# Patient Record
Sex: MALE | Race: BLACK OR AFRICAN AMERICAN | URBAN - METROPOLITAN AREA
[De-identification: names, ages, dates, MRNs, and addresses within clinical notes are randomized per-mention and may not be internally consistent; named-entity substitution may affect disease eponyms.]

---

## 2021-09-23 ENCOUNTER — OFFICE VISIT (OUTPATIENT)
Dept: FAMILY MEDICINE CLINIC | Facility: CLINIC | Age: 71
End: 2021-09-23
Payer: COMMERCIAL

## 2021-09-23 VITALS
TEMPERATURE: 98 F | RESPIRATION RATE: 17 BRPM | DIASTOLIC BLOOD PRESSURE: 76 MMHG | HEART RATE: 69 BPM | HEIGHT: 70 IN | SYSTOLIC BLOOD PRESSURE: 110 MMHG | WEIGHT: 180 LBS | BODY MASS INDEX: 25.77 KG/M2

## 2021-09-23 DIAGNOSIS — Z76.89 ESTABLISHING CARE WITH NEW DOCTOR, ENCOUNTER FOR: ICD-10-CM

## 2021-09-23 DIAGNOSIS — E78.5 HYPERLIPIDEMIA, UNSPECIFIED HYPERLIPIDEMIA TYPE: ICD-10-CM

## 2021-09-23 DIAGNOSIS — E11.9 TYPE 2 DIABETES MELLITUS WITHOUT COMPLICATION, WITHOUT LONG-TERM CURRENT USE OF INSULIN (HCC): Primary | ICD-10-CM

## 2021-09-23 DIAGNOSIS — I10 ESSENTIAL HYPERTENSION: ICD-10-CM

## 2021-09-23 PROBLEM — G89.11 ACUTE PAIN DUE TO INJURY: Status: ACTIVE | Noted: 2020-11-05

## 2021-09-23 PROBLEM — R58 RETROPERITONEAL BLEED: Status: ACTIVE | Noted: 2021-09-23

## 2021-09-23 PROBLEM — S22.32XA CLOSED FRACTURE OF ONE RIB OF LEFT SIDE: Status: ACTIVE | Noted: 2020-11-05

## 2021-09-23 PROBLEM — S06.0X1A CONCUSSION WITH LOSS OF CONSCIOUSNESS OF 30 MINUTES OR LESS: Status: ACTIVE | Noted: 2020-11-05

## 2021-09-23 PROBLEM — E66.3 OVERWEIGHT: Status: ACTIVE | Noted: 2019-04-04

## 2021-09-23 PROBLEM — S60.511A ABRASION OF RIGHT HAND: Status: ACTIVE | Noted: 2021-09-23

## 2021-09-23 PROBLEM — S00.03XA SCALP HEMATOMA: Status: ACTIVE | Noted: 2020-11-05
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
## 2021-09-23 PROBLEM — S36.892A TRAUMATIC RETROPERITONEAL HEMATOMA: Status: ACTIVE | Noted: 2020-11-05

## 2021-09-23 LAB
CARTRIDGE EXPIRATION DATE: 0 DATE
CARTRIDGE LOT#: 844 NUMERIC
HEMOGLOBIN A1C: 6.6 % (ref 4.3–5.6)

## 2021-09-23 PROCEDURE — 36415 COLL VENOUS BLD VENIPUNCTURE: CPT | Performed by: FAMILY MEDICINE

## 2021-09-23 PROCEDURE — 99204 OFFICE O/P NEW MOD 45 MIN: CPT | Performed by: FAMILY MEDICINE

## 2021-09-23 PROCEDURE — 83036 HEMOGLOBIN GLYCOSYLATED A1C: CPT | Performed by: FAMILY MEDICINE

## 2021-09-23 PROCEDURE — 3074F SYST BP LT 130 MM HG: CPT | Performed by: FAMILY MEDICINE

## 2021-09-23 PROCEDURE — 3008F BODY MASS INDEX DOCD: CPT | Performed by: FAMILY MEDICINE

## 2021-09-23 PROCEDURE — 3078F DIAST BP <80 MM HG: CPT | Performed by: FAMILY MEDICINE

## 2021-09-23 RX ORDER — GLIMEPIRIDE 1 MG/1
1 TABLET ORAL DAILY
Qty: 90 TABLET | Refills: 1 | Status: SHIPPED | OUTPATIENT
Start: 2021-09-23

## 2021-09-23 RX ORDER — METFORMIN HYDROCHLORIDE 500 MG/1
500 TABLET, EXTENDED RELEASE ORAL 2 TIMES DAILY WITH MEALS
Qty: 180 TABLET | Refills: 1 | Status: SHIPPED | OUTPATIENT
Start: 2021-09-23

## 2021-09-23 RX ORDER — ROSUVASTATIN CALCIUM 5 MG/1
5 TABLET, COATED ORAL NIGHTLY
COMMUNITY
End: 2021-09-23

## 2021-09-23 RX ORDER — LISINOPRIL 10 MG/1
10 TABLET ORAL AS DIRECTED
COMMUNITY
End: 2021-09-23

## 2021-09-23 RX ORDER — LISINOPRIL 10 MG/1
10 TABLET ORAL AS DIRECTED
Qty: 90 TABLET | Refills: 1 | Status: SHIPPED | OUTPATIENT
Start: 2021-09-23 | End: 2021-09-26

## 2021-09-23 RX ORDER — GLIMEPIRIDE 1 MG/1
1 TABLET ORAL DAILY
COMMUNITY
End: 2021-09-23

## 2021-09-23 RX ORDER — METFORMIN HYDROCHLORIDE 500 MG/1
TABLET, EXTENDED RELEASE ORAL
COMMUNITY
End: 2021-09-23

## 2021-09-23 RX ORDER — ROSUVASTATIN CALCIUM 5 MG/1
5 TABLET, COATED ORAL NIGHTLY
Qty: 90 TABLET | Refills: 1 | Status: SHIPPED | OUTPATIENT
Start: 2021-09-23

## 2021-09-23 NOTE — PATIENT INSTRUCTIONS
All adult screening ordered and done appropriate for patient's age and gender and risk factors and complaints. Medication reviewed and renewed where needed and appropriate. Encouraged physical fitness and daily physical activity daily.   Comply with medic

## 2021-09-23 NOTE — PROGRESS NOTES
Subjective:   Patient ID: Tessa Caicedo is a 80year old male.     80year old AA male here to establish care with a new physician and here for complete preventive care physical and for status update on any confirmed chronic medical illnesses and follow up He is alert and oriented to person, place, and time. Assessment & Plan:   1. Type 2 diabetes mellitus without complication, without long-term current use of insulin ([facility])  Status update.  - HEMOGLOBIN A1C    2.  Hyperlipidemia, unspecified hyperlipi

## 2021-09-24 ENCOUNTER — TELEPHONE (OUTPATIENT)
Dept: FAMILY MEDICINE CLINIC | Facility: CLINIC | Age: 71
End: 2021-09-24

## 2021-09-24 DIAGNOSIS — I10 ESSENTIAL HYPERTENSION: ICD-10-CM

## 2021-09-24 NOTE — TELEPHONE ENCOUNTER
Pt calling stating that the pharmacy needs more info about pts scripts that were sent. States they need more details like directions and dosage information. Please advise .

## 2021-09-24 NOTE — TELEPHONE ENCOUNTER
[facility], spoke with Weesh. 1)   Lisinopril- is it daily? 2)   Glimepiride- are you in agreement with this dosage, considering  the patients age?  ( [facility] one, he says the dosa

## 2021-09-26 RX ORDER — LISINOPRIL 10 MG/1
10 TABLET ORAL DAILY
Qty: 90 TABLET | Refills: 1 | Status: SHIPPED | OUTPATIENT
Start: 2021-09-26

## 2021-09-26 NOTE — TELEPHONE ENCOUNTER
The lisinopril prescription has been clarified and sent to the pharmacy. The glimepiride dosage is fine for this patient. Thank you.

## 2021-09-27 NOTE — TELEPHONE ENCOUNTER
Informed [facility] of medication per Dr. Houston Johnston. States understanding.

## 2022-03-12 RX ORDER — METFORMIN HYDROCHLORIDE 500 MG/1
TABLET, EXTENDED RELEASE ORAL
Qty: 180 TABLET | Refills: 1 | Status: SHIPPED | OUTPATIENT
Start: 2022-03-12

## 2022-03-12 RX ORDER — ROSUVASTATIN CALCIUM 5 MG/1
TABLET, COATED ORAL
Qty: 90 TABLET | Refills: 1 | Status: SHIPPED | OUTPATIENT
Start: 2022-03-12

## 2022-03-12 NOTE — TELEPHONE ENCOUNTER
Please review. Protocol failed / No Protocol.     Requested Prescriptions   Pending Prescriptions Disp Refills    METFORMIN  MG Oral Tablet 24 Hr [Pharmacy Med Name: METFORMIN ER 500MG 24HR TABS] 180 tablet 1     Sig: TAKE 1 TABLET(500 MG) BY MOUTH TWICE DAILY WITH MEALS        Diabetes Medication Protocol Failed - 3/11/2022 10:11 AM        Failed - GFR  > 50     No results found for: GFRAA              Failed - GFR in the past 12 months        Passed - Last A1C < 7.5 and within past 6 months     Lab Results   Component Value Date    A1C 6.6 (A) 09/23/2021               Passed - Appointment in past 6 or next 3 months           ROSUVASTATIN 5 MG Oral Tab [Pharmacy Med Name: ROSUVASTATIN 5MG TABLETS] 90 tablet 1     Sig: TAKE 1 TABLET(5 MG) BY MOUTH EVERY NIGHT        Cholesterol Medication Protocol Failed - 3/11/2022 10:11 AM        Failed - ALT in past 12 months        Failed - LDL in past 12 months        Failed - Last ALT < 80     No results found for: ALT          Failed - Last LDL < 130     No results found for: LDL            Passed - Appointment in past 12 or next 3 months               Recent Outpatient Visits              5 months ago Type 2 diabetes mellitus without complication, without long-term current use of insulin [facility])    [facility], [facility], [address], [place], [place], [place]    Office Visit

## 2022-03-30 RX ORDER — LISINOPRIL 10 MG/1
TABLET ORAL
Qty: 90 TABLET | Refills: 1 | Status: SHIPPED | OUTPATIENT
Start: 2022-03-30

## 2022-03-30 NOTE — TELEPHONE ENCOUNTER
Please review. Protocol failed / No protocol.   Requested Prescriptions   Pending Prescriptions Disp Refills    LISINOPRIL 10 MG Oral Tab [Pharmacy Med Name: LISINOPRIL 10MG TABLETS] 90 tablet 1     Sig: TAKE 1 TABLET(10 MG) BY MOUTH DAILY        Hypertensive Medications Protocol Failed - 3/30/2022  1:18 PM        Failed - CMP or BMP in past 12 months        Failed - Appointment in past 6 or next 3 months        Failed - GFR  > 50     No results found for: [address]                   Recent Outpatient Visits              6 months ago Type 2 diabetes mellitus without complication, without long-term current use of insulin [facility])    [facility] [place], [facility], [address] [facility], [place]    Office Visit

## 2022-05-26 DIAGNOSIS — E11.9 TYPE 2 DIABETES MELLITUS WITHOUT COMPLICATION, WITHOUT LONG-TERM CURRENT USE OF INSULIN (HCC): ICD-10-CM

## 2022-05-27 RX ORDER — GLIMEPIRIDE 1 MG/1
1 TABLET ORAL DAILY
Qty: 90 TABLET | Refills: 1 | Status: SHIPPED | OUTPATIENT
Start: 2022-05-27

## 2022-05-27 NOTE — TELEPHONE ENCOUNTER
Please review; protocol failed/no protocol. Requested Prescriptions   Pending Prescriptions Disp Refills    glimepiride 1 MG Oral Tab 90 tablet 1     Sig: Take 1 tablet (1 mg total) by mouth daily.         Diabetes Medication Protocol Failed - 5/26/2022  7:52 PM        Failed - Last A1C < 7.5 and within past 6 months     Lab Results   Component Value Date    A1C 6.6 (A) 09/23/2021               Failed - GFR  > 50     No results found for: GFRAA              Failed - GFR in the past 12 months        Passed - Appointment in past 6 or next 3 months               Recent Outpatient Visits              8 months ago Type 2 diabetes mellitus without complication, without long-term current use of insulin [facility])    [employer], [address] , [place], [place]    Office Visit             Future Appointments         Provider Department Appt Notes    In 1 month Dilia Heredia DO [employer], [address], [place] 6 month follow up/care partner policy informed to pt

## 2022-06-24 ENCOUNTER — OFFICE VISIT (OUTPATIENT)
Dept: FAMILY MEDICINE CLINIC | Facility: CLINIC | Age: 72
End: 2022-06-24
Payer: COMMERCIAL

## 2022-06-24 VITALS
DIASTOLIC BLOOD PRESSURE: 80 MMHG | BODY MASS INDEX: 26.05 KG/M2 | SYSTOLIC BLOOD PRESSURE: 130 MMHG | WEIGHT: 182 LBS | HEART RATE: 54 BPM | HEIGHT: 70 IN

## 2022-06-24 DIAGNOSIS — E11.9 TYPE 2 DIABETES MELLITUS WITHOUT COMPLICATION, WITHOUT LONG-TERM CURRENT USE OF INSULIN (HCC): Primary | ICD-10-CM

## 2022-06-24 DIAGNOSIS — I10 ESSENTIAL HYPERTENSION: ICD-10-CM

## 2022-06-24 DIAGNOSIS — Z12.11 COLON CANCER SCREENING: ICD-10-CM

## 2022-06-24 DIAGNOSIS — G89.29 CHRONIC PAIN OF LEFT WRIST: ICD-10-CM

## 2022-06-24 DIAGNOSIS — M25.532 CHRONIC PAIN OF LEFT WRIST: ICD-10-CM

## 2022-06-24 DIAGNOSIS — Z12.5 PROSTATE CANCER SCREENING: ICD-10-CM

## 2022-06-24 DIAGNOSIS — Z13.29 THYROID DISORDER SCREEN: ICD-10-CM

## 2022-06-24 DIAGNOSIS — S60.511A ABRASION OF RIGHT HAND, INITIAL ENCOUNTER: ICD-10-CM

## 2022-06-24 PROCEDURE — A4570 SPLINT: HCPCS | Performed by: FAMILY MEDICINE

## 2022-06-24 PROCEDURE — 3008F BODY MASS INDEX DOCD: CPT | Performed by: FAMILY MEDICINE

## 2022-06-24 PROCEDURE — 99214 OFFICE O/P EST MOD 30 MIN: CPT | Performed by: FAMILY MEDICINE

## 2022-06-24 PROCEDURE — 3079F DIAST BP 80-89 MM HG: CPT | Performed by: FAMILY MEDICINE

## 2022-06-24 PROCEDURE — 3075F SYST BP GE 130 - 139MM HG: CPT | Performed by: FAMILY MEDICINE

## 2022-06-24 NOTE — PATIENT INSTRUCTIONS
All adult screening ordered and done appropriate for patient's age and gender and risk factors and complaints. Medication reviewed and renewed where needed and appropriate. Comply with medications. Encouraged physical fitness and daily physical activity daily. Consider ortho/hand. Consider Medrol oral steroid Dosepak. Patient to be given a carpal tunnel splint today.   To GI.

## 2022-06-28 ENCOUNTER — PATIENT MESSAGE (OUTPATIENT)
Dept: FAMILY MEDICINE CLINIC | Facility: CLINIC | Age: 72
End: 2022-06-28

## 2022-06-28 DIAGNOSIS — G89.29 CHRONIC PAIN OF LEFT WRIST: Primary | ICD-10-CM

## 2022-06-28 DIAGNOSIS — M25.532 CHRONIC PAIN OF LEFT WRIST: Primary | ICD-10-CM

## 2022-06-28 NOTE — TELEPHONE ENCOUNTER
From: Oj Burnham  To: Jose Webb DO  Sent: 6/28/2022 11:35 AM CDT  Subject: My referral with Dr. Chana Ortez FW#[id number]    After calling his office, they are asking that I get an EMG test before my scheduled appointment on August 11th. They said Asher Resendez will have to put in an order for me to get it done. EMG pended but please check and finalize details.

## 2022-06-29 ENCOUNTER — LAB ENCOUNTER (OUTPATIENT)
Dept: LAB | Age: 72
End: 2022-06-29
Attending: FAMILY MEDICINE
Payer: COMMERCIAL

## 2022-06-29 DIAGNOSIS — R97.20 ELEVATED PSA, LESS THAN 10 NG/ML: Primary | ICD-10-CM

## 2022-06-29 DIAGNOSIS — N28.9 MILD RENAL INSUFFICIENCY: ICD-10-CM

## 2022-06-29 DIAGNOSIS — Z12.5 PROSTATE CANCER SCREENING: ICD-10-CM

## 2022-06-29 DIAGNOSIS — E11.9 TYPE 2 DIABETES MELLITUS WITHOUT COMPLICATION, WITHOUT LONG-TERM CURRENT USE OF INSULIN (HCC): ICD-10-CM

## 2022-06-29 DIAGNOSIS — I10 ESSENTIAL HYPERTENSION: ICD-10-CM

## 2022-06-29 DIAGNOSIS — Z13.29 THYROID DISORDER SCREEN: ICD-10-CM

## 2022-06-29 LAB
ALBUMIN SERPL-MCNC: 4 G/DL (ref 3.4–5)
ALBUMIN/GLOB SERPL: 1.1 {RATIO} (ref 1–2)
ALP LIVER SERPL-CCNC: 73 U/L
ALT SERPL-CCNC: 15 U/L
ANION GAP SERPL CALC-SCNC: 6 MMOL/L (ref 0–18)
AST SERPL-CCNC: 13 U/L (ref 15–37)
BASOPHILS # BLD AUTO: 0.04 X10(3) UL (ref 0–0.2)
BASOPHILS NFR BLD AUTO: 0.6 %
BILIRUB SERPL-MCNC: 0.6 MG/DL (ref 0.1–2)
BILIRUB UR QL: NEGATIVE
BUN BLD-MCNC: 20 MG/DL (ref 7–18)
BUN/CREAT SERPL: 14.3 (ref 10–20)
CALCIUM BLD-MCNC: 8.9 MG/DL (ref 8.5–10.1)
CHLORIDE SERPL-SCNC: 106 MMOL/L (ref 98–112)
CHOLEST SERPL-MCNC: 100 MG/DL (ref ?–200)
CLARITY UR: CLEAR
CO2 SERPL-SCNC: 24 MMOL/L (ref 21–32)
COLOR UR: YELLOW
COMPLEXED PSA SERPL-MCNC: 4.47 NG/ML (ref ?–4)
CREAT BLD-MCNC: 1.4 MG/DL
CREAT UR-SCNC: 23.1 MG/DL
DEPRECATED RDW RBC AUTO: 39.3 FL (ref 35.1–46.3)
EOSINOPHIL # BLD AUTO: 0.21 X10(3) UL (ref 0–0.7)
EOSINOPHIL NFR BLD AUTO: 3.2 %
ERYTHROCYTE [DISTWIDTH] IN BLOOD BY AUTOMATED COUNT: 13.2 % (ref 11–15)
EST. AVERAGE GLUCOSE BLD GHB EST-MCNC: 146 MG/DL (ref 68–126)
FASTING PATIENT LIPID ANSWER: YES
FASTING STATUS PATIENT QL REPORTED: YES
GLOBULIN PLAS-MCNC: 3.5 G/DL (ref 2.8–4.4)
GLUCOSE BLD-MCNC: 80 MG/DL (ref 70–99)
GLUCOSE UR-MCNC: NEGATIVE MG/DL
HBA1C MFR BLD: 6.7 % (ref ?–5.7)
HCT VFR BLD AUTO: 40.3 %
HDLC SERPL-MCNC: 34 MG/DL (ref 40–59)
HGB BLD-MCNC: 12.8 G/DL
HGB UR QL STRIP.AUTO: NEGATIVE
IMM GRANULOCYTES # BLD AUTO: 0.03 X10(3) UL (ref 0–1)
IMM GRANULOCYTES NFR BLD: 0.5 %
KETONES UR-MCNC: NEGATIVE MG/DL
LDLC SERPL CALC-MCNC: 38 MG/DL (ref ?–100)
LEUKOCYTE ESTERASE UR QL STRIP.AUTO: NEGATIVE
LYMPHOCYTES # BLD AUTO: 1.32 X10(3) UL (ref 1–4)
LYMPHOCYTES NFR BLD AUTO: 20.2 %
MCH RBC QN AUTO: 26.1 PG (ref 26–34)
MCHC RBC AUTO-ENTMCNC: 31.8 G/DL (ref 31–37)
MCV RBC AUTO: 82.1 FL
MICROALBUMIN UR-MCNC: 1.04 MG/DL
MICROALBUMIN/CREAT 24H UR-RTO: 45 UG/MG (ref ?–30)
MONOCYTES # BLD AUTO: 0.47 X10(3) UL (ref 0.1–1)
MONOCYTES NFR BLD AUTO: 7.2 %
NEUTROPHILS # BLD AUTO: 4.45 X10 (3) UL (ref 1.5–7.7)
NEUTROPHILS # BLD AUTO: 4.45 X10(3) UL (ref 1.5–7.7)
NEUTROPHILS NFR BLD AUTO: 68.3 %
NITRITE UR QL STRIP.AUTO: NEGATIVE
NONHDLC SERPL-MCNC: 66 MG/DL (ref ?–130)
OSMOLALITY SERPL CALC.SUM OF ELEC: 284 MOSM/KG (ref 275–295)
PH UR: 5 [PH] (ref 5–8)
PLATELET # BLD AUTO: 213 10(3)UL (ref 150–450)
POTASSIUM SERPL-SCNC: 4.4 MMOL/L (ref 3.5–5.1)
PROT SERPL-MCNC: 7.5 G/DL (ref 6.4–8.2)
PROT UR-MCNC: NEGATIVE MG/DL
RBC # BLD AUTO: 4.91 X10(6)UL
SODIUM SERPL-SCNC: 136 MMOL/L (ref 136–145)
SP GR UR STRIP: <=1.005 (ref 1–1.03)
TRIGL SERPL-MCNC: 167 MG/DL (ref 30–149)
TSI SER-ACNC: 2.02 MIU/ML (ref 0.36–3.74)
UROBILINOGEN UR STRIP-ACNC: 0.2
VLDLC SERPL CALC-MCNC: 23 MG/DL (ref 0–30)
WBC # BLD AUTO: 6.5 X10(3) UL (ref 4–11)

## 2022-06-29 PROCEDURE — 36415 COLL VENOUS BLD VENIPUNCTURE: CPT

## 2022-06-29 PROCEDURE — 84443 ASSAY THYROID STIM HORMONE: CPT

## 2022-06-29 PROCEDURE — 81003 URINALYSIS AUTO W/O SCOPE: CPT

## 2022-06-29 PROCEDURE — 85025 COMPLETE CBC W/AUTO DIFF WBC: CPT

## 2022-06-29 PROCEDURE — 82570 ASSAY OF URINE CREATININE: CPT

## 2022-06-29 PROCEDURE — 83036 HEMOGLOBIN GLYCOSYLATED A1C: CPT

## 2022-06-29 PROCEDURE — 80053 COMPREHEN METABOLIC PANEL: CPT

## 2022-06-29 PROCEDURE — 82043 UR ALBUMIN QUANTITATIVE: CPT

## 2022-06-29 PROCEDURE — 80061 LIPID PANEL: CPT

## 2022-06-30 ENCOUNTER — PATIENT MESSAGE (OUTPATIENT)
Dept: FAMILY MEDICINE CLINIC | Facility: CLINIC | Age: 72
End: 2022-06-30

## 2022-06-30 ENCOUNTER — NURSE ONLY (OUTPATIENT)
Dept: GASTROENTEROLOGY | Facility: CLINIC | Age: 72
End: 2022-06-30

## 2022-06-30 DIAGNOSIS — Z12.11 SCREEN FOR COLON CANCER: Primary | ICD-10-CM

## 2022-06-30 NOTE — PROGRESS NOTES
Clarisa Carcamo patient for a scheduled telephone colon screening. GI MD preference: none   Insurance:  BCBS  CBC from: 6/30/2022 resulted in [address]   PCP visit on: 6/24/2022    Last Procedure, Date, MD:  Previous cln 7-8 years ago at [address] [place] [address] in [place] (RACHEL faxed to [phone])    Anticoagulants: no  Diabetic Meds:  METFORMIN, GLIMEPIRIDE   BP meds(Ace inhibitors/ARB's): LISINOPRIL am dose   Weight loss meds (phentermine/vyvanse): no   Iron supplement (RX/OTC): no   Height & Weight/BMI: 5'10\"/182lbs/26  Hx of Cardiac/CVA issues/(MI/Stroke): no  Devices Pacemaker/Defibrillator/Stents: no  Resp. Issues/Oxygen Use/PARAS/COPD: no  Issues w/Anesthesia: no    Symptoms (Y/N): no     Family history of colon cancer: no     Medications, pharmacy, and allergies verified with patient over the phone. Please advise on orders and prep, thank you.

## 2022-06-30 NOTE — TELEPHONE ENCOUNTER
Order for left upper extremity EMG has been signed. Please call all pertinent parties and let them know.

## 2022-07-01 NOTE — TELEPHONE ENCOUNTER
Background, Jazlynt 6/30/2022 3:41 PM CDT    Is there any way Dr. Talha Fowler can call me at his earliest convenience to explain my lab results

## 2022-07-06 RX ORDER — POLYETHYLENE GLYCOL 3350, SODIUM CHLORIDE, SODIUM BICARBONATE, POTASSIUM CHLORIDE 420; 11.2; 5.72; 1.48 G/4L; G/4L; G/4L; G/4L
POWDER, FOR SOLUTION ORAL
Qty: 4000 ML | Refills: 0 | Status: SHIPPED | OUTPATIENT
Start: 2022-07-06

## 2022-07-06 NOTE — PROGRESS NOTES
Scheduling:  cln w/ iv or mac w/ Dr. Eugenie Azul or Dr Macdonald Remedies  Dx: crc screening  trilyte split dose sent e-scribe  Hold glimepiride, metformin day before and day of procedure  Hold lisinopril am of procedure

## 2022-07-11 NOTE — PROGRESS NOTES
Scheduled for:  Colonoscopy-screen [address]29[address]    Provider Name:  Dr. Aminta El  Date:  9/21/2022  Location:  [facility]  Sedation:  MAC  Time:  9:30 AM (pt is aware to arrive at 8:30 AM)  Prep:  Split dose trilyte   Meds/Allergies Reconciled?: victor hugo Almanza -reviewed    Diagnosis with codes:  Colorectal cancer screening Z12.11  Was patient informed to call insurance with codes (Y/N): I confirmed [employer] with this patient. Referral sent?:  Referral was sent at the time of electronic surgical scheduling. [address] [place] [address] or [facility] notified?:  I sent an electronic request to Endo Scheduling and received a confirmation today. Medication Orders:  HOLD GLIMEPIRIDE, METFORMIN DAY BEFORE AND DAY OF PROCEDURE   HOLD LISINOPRIL THE AM OF PROCEDURE   Misc Orders:  Patient was informed that they will need a COVID 19 test prior to their procedure. Patient verbally understood & will await a phone call from [facility] to schedule. Further instructions given by staff: I discussed the prep instructions with the patient which he verbally understood and is aware that I will send the instructions today via [address]

## 2022-07-27 ENCOUNTER — PROCEDURE VISIT (OUTPATIENT)
Dept: NEUROLOGY | Facility: CLINIC | Age: 72
End: 2022-07-27
Payer: COMMERCIAL

## 2022-07-27 DIAGNOSIS — G56.02 CARPAL TUNNEL SYNDROME OF LEFT WRIST: Primary | ICD-10-CM

## 2022-07-27 DIAGNOSIS — G56.22 CUBITAL TUNNEL SYNDROME ON LEFT: ICD-10-CM

## 2022-07-27 PROCEDURE — 95909 NRV CNDJ TST 5-6 STUDIES: CPT | Performed by: OTHER

## 2022-07-27 PROCEDURE — 95885 MUSC TST DONE W/NERV TST LIM: CPT | Performed by: OTHER

## 2022-07-28 ENCOUNTER — TELEPHONE (OUTPATIENT)
Dept: GASTROENTEROLOGY | Facility: CLINIC | Age: 72
End: 2022-07-28

## 2022-07-28 DIAGNOSIS — Z12.11 COLON CANCER SCREENING: Primary | ICD-10-CM

## 2022-08-08 NOTE — TELEPHONE ENCOUNTER
Patient requested to reschedule procedure to later date due to conflict of schedule case change sent to endo and revised prep sent to patient           Rescheduled for:  Colonoscopy-screen [address]    Provider Name:  Dr. Mak Owen  Date:  from 9/21/2022                           To 10/06/2022  Location:  [facility]  Sedation:  MAC  Time:  from 9:30                                              To 300  (pt is aware to arrive at 200  Prep:  Split dose trilyte   Meds/Allergies Reconciled?: victor hugo Chaparro -reviewed    Diagnosis with codes:  Colorectal cancer screening Z12.11  Was patient informed to call insurance with codes (Y/N): I confirmed m2p-labs with this patient. Referral sent?:  Referral was sent at the time of electronic surgical scheduling. [facility] or [address] notified?:  I sent an electronic request to Endo Scheduling and received a confirmation today. Medication Orders:  HOLD GLIMEPIRIDE, METFORMIN DAY BEFORE AND DAY OF PROCEDURE   HOLD LISINOPRIL THE AM OF PROCEDURE   Misc Orders:  Patient was informed that they will need a COVID 19 test prior to their procedure. Patient verbally understood & will await a phone call from [facility] to schedule. Further instructions given by staff: I discussed the prep instructions with the patient which he verbally understood and is aware that I will send the instructions today via [address]

## 2022-08-30 DIAGNOSIS — E11.9 TYPE 2 DIABETES MELLITUS WITHOUT COMPLICATION, WITHOUT LONG-TERM CURRENT USE OF INSULIN (HCC): ICD-10-CM

## 2022-08-30 DIAGNOSIS — E78.5 HYPERLIPIDEMIA, UNSPECIFIED HYPERLIPIDEMIA TYPE: ICD-10-CM

## 2022-08-30 RX ORDER — ROSUVASTATIN CALCIUM 5 MG/1
TABLET, COATED ORAL
Qty: 90 TABLET | Refills: 1 | Status: SHIPPED | OUTPATIENT
Start: 2022-08-30

## 2022-08-30 RX ORDER — METFORMIN HYDROCHLORIDE 500 MG/1
500 TABLET, EXTENDED RELEASE ORAL 2 TIMES DAILY WITH MEALS
Qty: 180 TABLET | Refills: 1 | Status: SHIPPED | OUTPATIENT
Start: 2022-08-30

## 2022-08-30 NOTE — TELEPHONE ENCOUNTER
Refill passed per [facility] protocol.     Requested Prescriptions   Pending Prescriptions Disp Refills    ROSUVASTATIN 5 MG Oral Tab [Pharmacy Med Name: ROSUVASTATIN 5MG TABLETS] 90 tablet 1     Sig: TAKE 1 TABLET(5 MG) BY MOUTH EVERY NIGHT        Cholesterol Medication Protocol Passed - 8/30/2022  9:53 AM        Passed - ALT in past 12 months        Passed - LDL in past 12 months        Passed - Last ALT < 80       Lab Results   Component Value Date    ALT 15 (L) 06/29/2022             Passed - Last LDL < 130     Lab Results   Component Value Date    LDL 38 06/29/2022               Passed - In person appointment or virtual visit in the past 12 mos or appointment in next 3 mos       Recent Outpatient Visits              2 months ago Screen for colon cancer    [address] GI PROCEDURE    Nurse Only    2 months ago Type 2 diabetes mellitus without complication, without long-term current use of insulin ([facility] [address]    [place] [facility], [place] [address], [place], [place]    Office Visit    11 months ago Type 2 diabetes mellitus without complication, without long-term current use of insulin ([facility] [address]    [facility], [place] [address], [facility], [place]    Office Visit     Future Appointments         Provider Department Appt Notes    In 1 month [place], PROCEDURE [address] GI PROCEDURE CLN KYLIE @ [facility]                   METFORMIN  MG Oral Tablet 24 Hr [Pharmacy Med Name: Jeet Lights ER 500MG 24HR TABS] 180 tablet 1     Sig: TAKE 1 TABLET(500 MG) BY MOUTH TWICE DAILY WITH MEALS        Diabetes Medication Protocol Failed - 8/30/2022  9:53 AM        Failed - GFR in the past 12 months        Passed - Last A1C < 7.5 and within past 6 months     Lab Results   Component Value Date    A1C 6.7 (H) 06/29/2022               Passed - In person appointment or virtual visit in the past 6 mos or appointment in next 3 mos       Recent Outpatient Visits              2 months ago Screen for colon cancer    [address] GI PROCEDURE Nurse Only    2 months ago Type 2 diabetes mellitus without complication, without long-term current use of insulin [facility])    [address], [address], [place], [place], [place]    Office Visit    11 months ago Type 2 diabetes mellitus without complication, without long-term current use of insulin [facility])    [address], [address], [place], Humberto Namrata, DO    Office Visit     Future Appointments         Provider Department Appt Notes    In 1 month [place], PROCEDURE [address] GI PROCEDURE Lisa @ [facility]                Passed - GFR > 50     No results found for: [facility]                  Recent Outpatient Visits              2 months ago Screen for colon cancer    [address] GI PROCEDURE    Nurse Only    2 months ago Type 2 diabetes mellitus without complication, without long-term current use of insulin ([facility] [address]    [address], [address], [place], Humberto Namrata, DO    Office Visit    11 months ago Type 2 diabetes mellitus without complication, without long-term current use of insulin [facility])    [address], [address], [place], Humberto Namrata, DO    Office Visit          Future Appointments         Provider Department Appt Notes    In 1 month [place] PROCEDURE [address] GI PROCEDURE Lisa @ [facility]

## 2022-08-30 NOTE — TELEPHONE ENCOUNTER
Please review. Protocol Failed or has No Protocol.     Requested Prescriptions   Pending Prescriptions Disp Refills    METFORMIN  MG Oral Tablet 24 Hr [Pharmacy Med Name: METFORMIN ER 500MG 24HR TABS] 180 tablet 1     Sig: TAKE 1 TABLET(500 MG) BY MOUTH TWICE DAILY WITH MEALS        Diabetes Medication Protocol Failed - 8/30/2022  9:53 AM        Failed - GFR in the past 12 months        Passed - Last A1C < 7.5 and within past 6 months     Lab Results   Component Value Date    A1C 6.7 (H) 06/29/2022               Passed - In person appointment or virtual visit in the past 6 mos or appointment in next 3 mos       Recent Outpatient Visits              2 months ago Screen for colon cancer    [address] GI PROCEDURE    Nurse Only    2 months ago Type 2 diabetes mellitus without complication, without long-term current use of insulin ([facility] [address]    [address] [facility] , [place], [place]    Office Visit    11 months ago Type 2 diabetes mellitus without complication, without long-term current use of insulin ([facility] [address]    [address] [facility] , [facility] Ingrid Cheema, DO    Office Visit     Future Appointments         Provider Department Appt Notes    In 1 month [place], PROCEDURE [address] GI PROCEDURE CLN SCRN @ [facility]                Passed - GFR > 50     No results found for: [facility]                Signed Prescriptions Disp Refills    ROSUVASTATIN 5 MG Oral Tab 90 tablet 1     Sig: TAKE 1 TABLET(5 MG) BY MOUTH EVERY NIGHT        Cholesterol Medication Protocol Passed - 8/30/2022  9:53 AM        Passed - ALT in past 12 months        Passed - LDL in past 12 months        Passed - Last ALT < 80       Lab Results   Component Value Date    ALT 15 (L) 06/29/2022             Passed - Last LDL < 130     Lab Results   Component Value Date    LDL 38 06/29/2022               Passed - In person appointment or virtual visit in the past 12 mos or appointment in next 3 mos       Recent Outpatient Visits              2 months ago Screen for colon cancer    [address] GI PROCEDURE    Nurse Only    2 months ago Type 2 diabetes mellitus without complication, without long-term current use of insulin ([facility] [address]    [address], [place], [place]    Office Visit    11 months ago Type 2 diabetes mellitus without complication, without long-term current use of insulin [facility])    [address], [place], [place]    Office Visit     Future Appointments         Provider Department Appt Notes    In 1 month [place], PROCEDURE [address] GI PROCEDURE Lisa @ [address]                      Recent Outpatient Visits              2 months ago Screen for colon cancer    [address] GI PROCEDURE    Nurse Only    2 months ago Type 2 diabetes mellitus without complication, without long-term current use of insulin ([facility] [address]    [address], [place], [place]    Office Visit    11 months ago Type 2 diabetes mellitus without complication, without long-term current use of insulin [facility])    [address], [place], [place]    Office Visit            Future Appointments         Provider Department Appt Notes    In 1 month [place], PROCEDURE [address] GI PROCEDURE Lisa @ [facility]

## 2022-09-22 DIAGNOSIS — I10 ESSENTIAL HYPERTENSION: ICD-10-CM

## 2022-09-22 RX ORDER — LISINOPRIL 10 MG/1
10 TABLET ORAL DAILY
Qty: 90 TABLET | Refills: 1 | Status: SHIPPED | OUTPATIENT
Start: 2022-09-22

## 2022-09-22 NOTE — TELEPHONE ENCOUNTER
Refill passed per [employer] protocol.   Requested Prescriptions   Pending Prescriptions Disp Refills    LISINOPRIL 10 MG Oral Tab [Pharmacy Med Name: LISINOPRIL 10MG TABLETS] 90 tablet 1     Sig: TAKE 1 TABLET(10 MG) BY MOUTH DAILY        Hypertensive Medications Protocol Passed - 9/22/2022  9:18 AM        Passed - In person appointment in the past 12 or next 3 months       Recent Outpatient Visits              2 months ago Screen for colon cancer    [address] GI PROCEDURE    Nurse Only    3 months ago Type 2 diabetes mellitus without complication, without long-term current use of insulin ([facility] [address]    [place] [facility], [place] [address], [facility],     Office Visit    12 months ago Type 2 diabetes mellitus without complication, without long-term current use of insulin [facility]    [place] [facility], [place] [address], [facility],     Office Visit     Future Appointments         Provider Department Appt Notes    In 2 weeks [place], PROCEDURE [address] GI PROCEDURE CLN SCRN @ [facility]                Passed - Last BP reading less than 140/90     BP Readings from Last 1 Encounters:  06/24/22 : 130/80                Passed - CMP or BMP in past 6 months     Recent Results (from the past 4392 hour(s))   COMP METABOLIC PANEL (14)    Collection Time: 06/29/22  3:22 PM   Result Value Ref Range    Glucose 80 70 - 99 mg/dL    Sodium 136 136 - 145 mmol/L    Potassium 4.4 3.5 - 5.1 mmol/L    Chloride 106 98 - 112 mmol/L    CO2 24.0 21.0 - 32.0 mmol/L    Anion Gap 6 0 - 18 mmol/L    BUN 20 (H) 7 - 18 mg/dL    Creatinine 1.40 (H) 0.70 - 1.30 mg/dL    BUN/CREA Ratio 14.3 10.0 - 20.0    Calcium, Total 8.9 8.5 - 10.1 mg/dL    Calculated Osmolality 284 275 - 295 mOsm/kg    GFR, Non- 50 (L) >=60    GFR, -American 58 (L) >=60    ALT 15 (L) 16 - 61 U/L    AST 13 (L) 15 - 37 U/L    Alkaline Phosphatase 73 45 - 117 U/L    Bilirubin, Total 0.6 0.1 - 2.0 mg/dL    Total Protein 7.5 6.4 - 8.2 g/dL    Albumin 4.0 3.4 - 5.0 g/dL    Globulin  3.5 2.8 - 4.4 g/dL    A/G Ratio 1.1 1.0 - 2.0    Patient Fasting for CMP? Yes      *Note: Due to a large number of results and/or encounters for the requested time period, some results have not been displayed. A complete set of results can be found in Results Review.                  Passed - In person appointment or virtual visit in the past 6 months       Recent Outpatient Visits              2 months ago Screen for colon cancer    [address] GI PROCEDURE    Nurse Only    3 months ago Type 2 diabetes mellitus without complication, without long-term current use of insulin ([facility] [address]    [facility], [facility], DO    Office Visit    12 months ago Type 2 diabetes mellitus without complication, without long-term current use of insulin [facility])    [address] [facility] [address], DO    Office Visit     Future Appointments         Provider Department Appt Notes    In 2 weeks [place], PROCEDURE [address] GI PROCEDURE Lisa @ [address] [place] [address] or GFRAA > 50     GFR Evaluation  GFRAA: 62 , resulted on 6/29/2022                Recent Outpatient Visits              2 months ago Screen for colon cancer    [address] GI PROCEDURE    Nurse Only    3 months ago Type 2 diabetes mellitus without complication, without long-term current use of insulin ([facility] [address]    [address] [facility] [address] [place] [address], DO    Office Visit    12 months ago Type 2 diabetes mellitus without complication, without long-term current use of insulin [facility])    [address] [facility] , [facility] [place], DO    Office Visit          Future Appointments         Provider Department Appt Notes    In 2 weeks [place], PROCEDURE [address] GI PROCEDURE Lisa @ [facility]

## 2022-10-06 ENCOUNTER — HOSPITAL ENCOUNTER (OUTPATIENT)
Age: 72
Setting detail: HOSPITAL OUTPATIENT SURGERY
Discharge: HOME | End: 2022-10-06
Attending: INTERNAL MEDICINE | Admitting: INTERNAL MEDICINE
Payer: COMMERCIAL

## 2022-10-06 ENCOUNTER — TELEPHONE (OUTPATIENT)
Dept: GASTROENTEROLOGY | Facility: CLINIC | Age: 72
End: 2022-10-06

## 2022-10-06 ENCOUNTER — ANESTHESIA (OUTPATIENT)
Dept: ENDOSCOPY | Age: 72
End: 2022-10-06
Payer: COMMERCIAL

## 2022-10-06 ENCOUNTER — ANESTHESIA EVENT (OUTPATIENT)
Dept: ENDOSCOPY | Age: 72
End: 2022-10-06
Payer: COMMERCIAL

## 2022-10-06 VITALS
HEART RATE: 53 BPM | HEIGHT: 70 IN | WEIGHT: 185 LBS | RESPIRATION RATE: 12 BRPM | SYSTOLIC BLOOD PRESSURE: 141 MMHG | BODY MASS INDEX: 26.48 KG/M2 | DIASTOLIC BLOOD PRESSURE: 88 MMHG | OXYGEN SATURATION: 100 %

## 2022-10-06 DIAGNOSIS — Z12.11 SCREEN FOR COLON CANCER: ICD-10-CM

## 2022-10-06 LAB — GLUCOSE BLDC GLUCOMTR-MCNC: 110 MG/DL (ref 70–99)

## 2022-10-06 PROCEDURE — 45378 DIAGNOSTIC COLONOSCOPY: CPT | Performed by: INTERNAL MEDICINE

## 2022-10-06 RX ORDER — SODIUM CHLORIDE, SODIUM LACTATE, POTASSIUM CHLORIDE, CALCIUM CHLORIDE 600; 310; 30; 20 MG/100ML; MG/100ML; MG/100ML; MG/100ML
INJECTION, SOLUTION INTRAVENOUS CONTINUOUS
Status: DISCONTINUED | OUTPATIENT
Start: 2022-10-06 | End: 2022-10-06

## 2022-10-06 NOTE — TELEPHONE ENCOUNTER
Entered into Epic. Recall CLN in 3 years per Dr. Jennifer Spurling. Last CLN done 10/6/2022. Recall entered into Patient Outreach for 10/6/2025. Health Maintenance updated.
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
## 2022-10-06 NOTE — ANESTHESIA POSTPROCEDURE EVALUATION
Patient: Michela Gaines    Procedure Summary     Date: 10/06/22 Room / Location: [facility] ENDOSCOPY 01 / [facility] ENDO    Anesthesia Start: 1835 Anesthesia Stop: 9507    Procedure: COLONOSCOPY (N/A ) Diagnosis:       Screen for colon cancer      (fair prep, hemorrhoids)    Surgeons: Chantel Hopkins MD Anesthesiologist:     Anesthesia Type: general ASA Status: 2          Anesthesia Type: general    Vitals Value Taken Time   /71 10/06/22 1528   Temp  10/06/22 1530   Pulse 60 10/06/22 1528   Resp 18 10/06/22 1528   SpO2 100 % 10/06/22 1528       [facility] AN Post Evaluation:   Patient Evaluated in PACU  Patient Participation: complete - patient participated  Level of Consciousness: awake  Pain Score: 0  Pain Management: adequate  Airway Patency:patent  Dental exam unchanged from preop  Yes    Cardiovascular Status: acceptable  Respiratory Status: acceptable  Postoperative Hydration acceptable      Yessica Alonzo MD  10/6/2022 3:30 PM

## 2022-10-06 NOTE — H&P
Pre Procedure History & Physical Examination    Patient Name: Haley Lindo  MRN: [id number]  CSN: [id number]  YOB: 1950    Diagnosis: screening colonoscopy    lisinopril 10 MG Oral Tab, Take 1 tablet (10 mg total) by mouth daily. , Disp: 90 tablet, Rfl: 1  ROSUVASTATIN 5 MG Oral Tab, TAKE 1 TABLET(5 MG) BY MOUTH EVERY NIGHT, Disp: 90 tablet, Rfl: 1  metFORMIN  MG Oral Tablet 24 Hr, Take 1 tablet (500 mg total) by mouth 2 (two) times daily with meals. , Disp: 180 tablet, Rfl: 1  PEG 3350-KCl-Na Bicarb-NaCl (TRILYTE) 420 g Oral Recon Soln, Take prep as directed by gastro office. May substitute with Trilyte/generic equivalent if needed. , Disp: 4000 mL, Rfl: 0  glimepiride 1 MG Oral Tab, Take 1 tablet (1 mg total) by mouth daily. (Patient taking differently: Take 1 mg by mouth daily. Patient is taking differently 5mg), Disp: 90 tablet, Rfl: 1      No current facility-administered medications for this encounter. Allergies: No Known Allergies    No past medical history on file. No past surgical history on file. No family history on file. Social History    Tobacco Use      Smoking status: Never Smoker      Smokeless tobacco: Never Used    Alcohol use: Not on file        ROS:   CONSTITUTIONAL:  negative for fevers, rigors  EYES:  negative for diplopia   RESPIRATORY:  negative for severe shortness of breath  CARDIOVASCULAR:  negative for crushing sub-sternal chest pain  GASTROINTESTINAL:  see HPI  GENITOURINARY:  negative for dysuria or gross hematuria  INTEGUMENT/BREAST:  SKIN:  negative for jaundice   ALLERGIC/IMMUNOLOGIC:  negative for hay fever  ENDOCRINE:  negative for cold intolerance and heat intolerance  MUSCULOSKELETAL:  negative for joint effusion/severe erythema  BEHAVIOR/PSYCH:  negative for psychotic behavior      PHYSICAL EXAM:   There were no vitals taken for this visit.       Gen: Patient appears comfortable and in no acute discomfort  HEENT: the sclera appears anicteric, oropharynx clear, mucus membranes appear moist  CV: regular rate and rhythm, the extremities are warm and well perfused   Lung: Moves air well; No labored breathing  Abdomen: soft, non-tender exam in all quadrants without rigidity or guarding, non-distended, no abnormal bowel sounds noted, no masses are palpated  Skin: No jaundice  Ext: no cyanosis, clubbing or edema is evident. Neuro: Alert and interactive, and gross movements of extremities normal    I have discussed the risks and benefits and alternatives of the procedure with the patient/family. They understand and agree to proceed with plan of care.    I have reviewed recent H&P/clinic notes  Mayo Fabry, MD  [facility] - Gastroenterology  10/6/2022  2:37 PM

## 2022-10-17 ENCOUNTER — PATIENT MESSAGE (OUTPATIENT)
Dept: FAMILY MEDICINE CLINIC | Facility: CLINIC | Age: 72
End: 2022-10-17

## 2022-10-28 ENCOUNTER — IMMUNIZATION (OUTPATIENT)
Dept: LAB | Age: 72
End: 2022-10-28
Attending: EMERGENCY MEDICINE
Payer: COMMERCIAL

## 2022-10-28 DIAGNOSIS — Z23 NEED FOR VACCINATION: Primary | ICD-10-CM

## 2022-10-28 PROCEDURE — 0134A SARSCOV2 VAC BVL 50MCG/0.5ML: CPT

## 2022-11-22 DIAGNOSIS — E11.9 TYPE 2 DIABETES MELLITUS WITHOUT COMPLICATION, WITHOUT LONG-TERM CURRENT USE OF INSULIN (HCC): ICD-10-CM

## 2022-11-22 NOTE — TELEPHONE ENCOUNTER
Refill passed per [address] protocol    Requested Prescriptions   Pending Prescriptions Disp Refills    GLIMEPIRIDE 1 MG Oral Tab [Pharmacy Med Name: GLIMEPIRIDE 1MG TABLETS] 90 tablet 1     Sig: TAKE 1 TABLET(1 MG) BY MOUTH DAILY       Diabetes Medication Protocol Passed - 11/22/2022  3:05 PM        Passed - Last A1C < 7.5 and within past 6 months     Lab Results   Component Value Date    A1C 6.7 (H) 06/29/2022             Passed - In person appointment or virtual visit in the past 6 mos or appointment in next 3 mos     Recent Outpatient Visits              4 months ago Screen for colon cancer    [address] GI PROCEDURE    Nurse Only    5 months ago Type 2 diabetes mellitus without complication, without long-term current use of insulin ([address]    [address] [place] , [facility], DO    Office Visit    1 year ago Type 2 diabetes mellitus without complication, without long-term current use of insulin ([address]    [address] [place], [facility], DO    Office Visit                      Passed - EGFRCR or GFRAA > 50     GFR Evaluation  GFRAA: 58 , resulted on 6/29/2022          Passed - GFR in the past 12 months                 Recent Outpatient Visits              4 months ago Screen for colon cancer    [address] GI PROCEDURE    Nurse Only    5 months ago Type 2 diabetes mellitus without complication, without long-term current use of insulin ([address]    [address], [address], [facility], DO    Office Visit    1 year ago Type 2 diabetes mellitus without complication, without long-term current use of insulin [facility])    [address], [address], [place], [place]    Office Visit

## 2022-11-22 NOTE — TELEPHONE ENCOUNTER
I called patient to inquire how he is taking his:    glimepiride 1 MG. Per patient he is taking 1 mg in the morning and 1 mg in the evening. This is not what pcp prescribed so med is pended for review. Dr. Yusef Soni: ok to keep this script as is pended? I did advise him not to change medications without his provider knowing.  I also advised a follow up since last visit was June, 24 2022

## 2022-11-25 RX ORDER — GLIMEPIRIDE 1 MG/1
1 TABLET ORAL 2 TIMES DAILY WITH MEALS
Qty: 180 TABLET | Refills: 1 | Status: SHIPPED | OUTPATIENT
Start: 2022-11-25

## 2023-02-24 DIAGNOSIS — E78.5 HYPERLIPIDEMIA, UNSPECIFIED HYPERLIPIDEMIA TYPE: ICD-10-CM

## 2023-02-24 DIAGNOSIS — E11.9 TYPE 2 DIABETES MELLITUS WITHOUT COMPLICATION, WITHOUT LONG-TERM CURRENT USE OF INSULIN (HCC): ICD-10-CM

## 2023-02-28 RX ORDER — METFORMIN HYDROCHLORIDE 500 MG/1
TABLET, EXTENDED RELEASE ORAL
Qty: 180 TABLET | Refills: 1 | Status: SHIPPED | OUTPATIENT
Start: 2023-02-28

## 2023-02-28 RX ORDER — ROSUVASTATIN CALCIUM 5 MG/1
TABLET, COATED ORAL
Qty: 90 TABLET | Refills: 1 | Status: SHIPPED | OUTPATIENT
Start: 2023-02-28

## 2023-02-28 NOTE — TELEPHONE ENCOUNTER
Refill passed per [employer], [facility] protocol.   Requested Prescriptions   Pending Prescriptions Disp Refills    ROSUVASTATIN 5 MG Oral Tab [Pharmacy Med Name: ROSUVASTATIN 5MG TABLETS] 90 tablet 1     Sig: TAKE 1 TABLET(5 MG) BY MOUTH EVERY NIGHT       Cholesterol Medication Protocol Passed - 2/24/2023  1:48 PM        Passed - ALT in past 12 months        Passed - LDL in past 12 months        Passed - Last ALT < 80     Lab Results   Component Value Date    ALT 15 (L) 06/29/2022             Passed - Last LDL < 130     Lab Results   Component Value Date    LDL 38 06/29/2022             Passed - In person appointment or virtual visit in the past 12 mos or appointment in next 3 mos     Recent Outpatient Visits              8 months ago Screen for colon cancer    [address], [address], [place]    Nurse Only    8 months ago Type 2 diabetes mellitus without complication, without long-term current use of insulin ([facility] [address]    [address], [facility] [place], [address] [place]    Office Visit    1 year ago Type 2 diabetes mellitus without complication, without long-term current use of insulin [facility])    [address], [facility], [place], [address], DO    Office Visit          Future Appointments         Provider Department Appt Notes    In 3 months Maggie Gramajo DO [address], [address], [employer] My overall health                 METFORMIN  MG Oral Tablet 24 Hr [Pharmacy Med Name: METFORMIN ER 500MG 24HR TABS] 180 tablet 1     Sig: TAKE 1 TABLET(500 MG) BY MOUTH TWICE DAILY WITH MEALS       Diabetes Medication Protocol Failed - 2/24/2023  1:48 PM        Failed - Last A1C < 7.5 and within past 6 months     Lab Results   Component Value Date    A1C 6.7 (H) 06/29/2022             Failed - In person appointment or virtual visit in the past 6 mos or appointment in next 3 mos     Recent Outpatient Visits              8 months ago Screen for colon cancer    [facility], [place] [address], [facility]    Nurse Only    8 months ago Type 2 diabetes mellitus without complication, without long-term current use of insulin ([facility]    [address] Aly, [place], [place]    Office Visit    1 year ago Type 2 diabetes mellitus without complication, without long-term current use of insulin [facility])    [address], [address], [place], [place]    Office Visit          Future Appointments         Provider Department Appt Notes    In 3 months DO [address], [address], [facility] My overall health               Passed - EGFRCR or GFRAA > 50     GFR Evaluation  GFRAA: 58 , resulted on 6/29/2022          Passed - GFR in the past 12 months

## 2023-02-28 NOTE — TELEPHONE ENCOUNTER
Please review refill failed/no protocol     Requested Prescriptions     Pending Prescriptions Disp Refills    METFORMIN  MG Oral Tablet 24 Hr [Pharmacy Med Name: METFORMIN ER 500MG 24HR TABS] 180 tablet 1     Sig: TAKE 1 TABLET(500 MG) BY MOUTH TWICE DAILY WITH MEALS     Signed Prescriptions Disp Refills    ROSUVASTATIN 5 MG Oral Tab 90 tablet 1     Sig: TAKE 1 TABLET(5 MG) BY MOUTH EVERY NIGHT     Authorizing Provider: Aisha Polanco     Ordering User: Tesha Hill         Recent Visits  Date Type Provider Dept   06/24/22 Office Visit Kellie Wilson, DO [facility]   09/23/21 Office Visit Kellie Wilson,  [facility]   Showing recent visits within past 540 days with a meds authorizing provider and meeting all other requirements  Future Appointments  Date Type Provider Dept   06/08/23 Appointment Kellie Wilson DO [facility]   Showing future appointments within next 150 days with a meds authorizing provider and meeting all other requirements    Requested Prescriptions   Pending Prescriptions Disp Refills    METFORMIN  MG Oral Tablet 24 Hr [Pharmacy Med Name: METFORMIN ER 500MG 24HR TABS] 180 tablet 1     Sig: TAKE 1 TABLET(500 MG) BY MOUTH TWICE DAILY WITH MEALS       Diabetes Medication Protocol Failed - 2/24/2023  1:48 PM        Failed - Last A1C < 7.5 and within past 6 months     Lab Results   Component Value Date    A1C 6.7 (H) 06/29/2022             Failed - In person appointment or virtual visit in the past 6 mos or appointment in next 3 mos     Recent Outpatient Visits              8 months ago Screen for colon cancer    Nohemy Macedo, [address] [facility],[address], [place]    Nurse Only    8 months ago Type 2 diabetes mellitus without complication, without long-term current use of insulin ([facility]    No[address]my Macedo, [address], [place], [place]    Office Visit    1 year ago Type 2 diabetes mellitus without complication, without long-term current use of insulin [facility])    [address] Singer, [place], [place]    Office Visit          Future Appointments         Provider Department Appt Notes    In 3 months Prakash Lackey DO [address], [address], [address] My overall health               Passed - EGFRCR or GFRAA > 50     GFR Evaluation  GFRAA: 58 , resulted on 6/29/2022          Passed - GFR in the past 12 months         Signed Prescriptions Disp Refills    ROSUVASTATIN 5 MG Oral Tab 90 tablet 1     Sig: TAKE 1 TABLET(5 MG) BY MOUTH EVERY NIGHT       Cholesterol Medication Protocol Passed - 2/24/2023  1:48 PM        Passed - ALT in past 12 months        Passed - LDL in past 12 months        Passed - Last ALT < 80     Lab Results   Component Value Date    ALT 15 (L) 06/29/2022             Passed - Last LDL < 130     Lab Results   Component Value Date    LDL 38 06/29/2022             Passed - In person appointment or virtual visit in the past 12 mos or appointment in next 3 mos     Recent Outpatient Visits              8 months ago Screen for colon cancer    [address], [address], [place]    Nurse Only    8 months ago Type 2 diabetes mellitus without complication, without long-term current use of insulin ([facility] [address]    [address], [address], [place], [place]    Office Visit    1 year ago Type 2 diabetes mellitus without complication, without long-term current use of insulin [facility])    [address], [address], [place], [place]    Office Visit          Future Appointments         Provider Department Appt Notes    In 3 months Prakash Lackey DO [address], [address], [address] My overall health

## 2023-03-29 DIAGNOSIS — I10 ESSENTIAL HYPERTENSION: ICD-10-CM

## 2023-03-29 NOTE — TELEPHONE ENCOUNTER
Please review; protocol failed/No Protcol    Requested Prescriptions   Pending Prescriptions Disp Refills    LISINOPRIL 10 MG Oral Tab [Pharmacy Med Name: LISINOPRIL 10MG TABLETS] 90 tablet 1     Sig: TAKE 1 TABLET(10 MG) BY MOUTH DAILY       Hypertensive Medications Protocol Failed - 3/29/2023  7:29 AM        Failed - Last BP reading less than 140/90     BP Readings from Last 1 Encounters:  10/06/22 : 141/88              Failed - CMP or BMP in past 6 months     No results found for this or any previous visit (from the past 4392 hour(s)).             Failed - In person appointment or virtual visit in the past 6 months     Recent Outpatient Visits              9 months ago Screen for colon cancer    [address], [address], [place]    Nurse Only    9 months ago Type 2 diabetes mellitus without complication, without long-term current use of insulin ([facility] [address]    [address], [address], [place], [place]    Office Visit    1 year ago Type 2 diabetes mellitus without complication, without long-term current use of insulin [facility])    [address], [address], [place], [place]    Office Visit          Future Appointments         Provider Department Appt Notes    In 2 months Pattie Sanders DO [address], [address], [place] My overall health               Passed - In person appointment in the past 12 or next 3 months     Recent Outpatient Visits              9 months ago Screen for colon cancer    [address], [address], [place]    Nurse Only    9 months ago Type 2 diabetes mellitus without complication, without long-term current use of insulin ([facility] [address]    [address] [facility], [place], [place], [place]    Office Visit    1 year ago Type 2 diabetes mellitus without complication, without long-term current use of insulin [facility])    [address], [address], [facility], [facility],     Office Visit          Future Appointments         Provider Department Appt Notes    In 2 months Chivo Sheridan DO [facility], [facility] My overall health               Passed - EGFRCR or GFRAA > 50     GFR Evaluation  GFRAA: 58 , resulted on 6/29/2022               Recent Outpatient Visits              9 months ago Screen for colon cancer    [facility], [address], [facility]    Nurse Only    9 months ago Type 2 diabetes mellitus without complication, without long-term current use of insulin ([facility] [address]    [address], [facility], [place], [place]    Office Visit    1 year ago Type 2 diabetes mellitus without complication, without long-term current use of insulin [facility])    [address], [facility], [place], [place]    Office Visit            Future Appointments         Provider Department Appt Notes    In 2 months DO Vy Wright [address], [facility] My overall health

## 2023-03-30 RX ORDER — LISINOPRIL 10 MG/1
TABLET ORAL
Qty: 90 TABLET | Refills: 1 | Status: SHIPPED | OUTPATIENT
Start: 2023-03-30

## 2023-05-18 DIAGNOSIS — E11.9 TYPE 2 DIABETES MELLITUS WITHOUT COMPLICATION, WITHOUT LONG-TERM CURRENT USE OF INSULIN (HCC): ICD-10-CM

## 2023-05-18 RX ORDER — GLIMEPIRIDE 1 MG/1
TABLET ORAL
Qty: 180 TABLET | Refills: 1 | Status: SHIPPED | OUTPATIENT
Start: 2023-05-18

## 2023-05-19 NOTE — TELEPHONE ENCOUNTER
Future Appointments   Date Time Provider Gary Ruiz   6/8/2023  3:40 PM Kellie Wilson DO [facility]         Please review; protocol failed/no protocol.      Requested Prescriptions   Pending Prescriptions Disp Refills    GLIMEPIRIDE 1 MG Oral Tab [Pharmacy Med Name: GLIMEPIRIDE 1MG TABLETS] 180 tablet 1     Sig: TAKE 1 TABLET(1 MG) BY MOUTH TWICE DAILY WITH MEALS       Diabetes Medication Protocol Failed - 5/18/2023  7:35 AM        Failed - Last A1C < 7.5 and within past 6 months     Lab Results   Component Value Date    A1C 6.7 (H) 06/29/2022               Passed - In person appointment or virtual visit in the past 6 mos or appointment in next 3 mos     Recent Outpatient Visits              10 months ago Screen for colon cancer    [address], [address] [facility],[address], [place]    Nurse Only    10 months ago Type 2 diabetes mellitus without complication, without long-term current use of insulin ([facility])    [address], [address], [place], [address], [place]    Office Visit    1 year ago Type 2 diabetes mellitus without complication, without long-term current use of insulin [facility])    [address], [address], [place], [address], [place]    Office Visit          Future Appointments         Provider Department Appt Notes    In 3 weeks Kellie Wilson DO [address], [address], [place] My overall health               Passed - EGFRCR or GFRAA > 50     GFR Evaluation  GFRAA: 58 , resulted on 6/29/2022            Passed - GFR in the past 12 months              Recent Outpatient Visits              10 months ago Screen for colon cancer    [address], [address], [place]    Nurse Only    10 months ago Type 2 diabetes mellitus without complication, without long-term current use of insulin ([facility])    [address], [address], [place], [address], [place]    Office Visit    1 year ago Type 2 diabetes mellitus without complication, without long-term current use of insulin [facility])    [address] [facility], [place], [place], [place]    Office Visit             Future Appointments         Provider Department Appt Notes    In 3 weeks Dilia Heredia, DO [address], [address], [place] My overall health

## 2023-06-02 DIAGNOSIS — E78.5 HYPERLIPIDEMIA, UNSPECIFIED HYPERLIPIDEMIA TYPE: ICD-10-CM

## 2023-06-02 DIAGNOSIS — E11.9 TYPE 2 DIABETES MELLITUS WITHOUT COMPLICATION, WITHOUT LONG-TERM CURRENT USE OF INSULIN (HCC): ICD-10-CM

## 2023-06-02 RX ORDER — ROSUVASTATIN CALCIUM 5 MG/1
TABLET, COATED ORAL
Qty: 90 TABLET | Refills: 1 | OUTPATIENT
Start: 2023-06-02

## 2023-06-02 RX ORDER — METFORMIN HYDROCHLORIDE 500 MG/1
TABLET, EXTENDED RELEASE ORAL
Qty: 180 TABLET | Refills: 1 | OUTPATIENT
Start: 2023-06-02

## 2023-06-08 ENCOUNTER — OFFICE VISIT (OUTPATIENT)
Dept: FAMILY MEDICINE CLINIC | Facility: CLINIC | Age: 73
End: 2023-06-08

## 2023-06-08 VITALS
HEIGHT: 70 IN | WEIGHT: 185 LBS | BODY MASS INDEX: 26.48 KG/M2 | DIASTOLIC BLOOD PRESSURE: 70 MMHG | SYSTOLIC BLOOD PRESSURE: 100 MMHG | TEMPERATURE: 98 F | HEART RATE: 65 BPM

## 2023-06-08 DIAGNOSIS — E11.9 TYPE 2 DIABETES MELLITUS WITHOUT COMPLICATION, WITHOUT LONG-TERM CURRENT USE OF INSULIN (HCC): ICD-10-CM

## 2023-06-08 DIAGNOSIS — Z00.00 ROUTINE PHYSICAL EXAMINATION: Primary | ICD-10-CM

## 2023-06-08 DIAGNOSIS — Z23 NEED FOR ZOSTER VACCINATION: ICD-10-CM

## 2023-06-08 DIAGNOSIS — R97.20 ELEVATED PSA, LESS THAN 10 NG/ML: ICD-10-CM

## 2023-06-08 PROCEDURE — 99214 OFFICE O/P EST MOD 30 MIN: CPT | Performed by: FAMILY MEDICINE

## 2023-06-08 PROCEDURE — 3078F DIAST BP <80 MM HG: CPT | Performed by: FAMILY MEDICINE

## 2023-06-08 PROCEDURE — 3074F SYST BP LT 130 MM HG: CPT | Performed by: FAMILY MEDICINE

## 2023-06-08 PROCEDURE — 99397 PER PM REEVAL EST PAT 65+ YR: CPT | Performed by: FAMILY MEDICINE

## 2023-06-08 PROCEDURE — 3008F BODY MASS INDEX DOCD: CPT | Performed by: FAMILY MEDICINE

## 2023-06-13 ENCOUNTER — LAB ENCOUNTER (OUTPATIENT)
Dept: LAB | Facility: REFERENCE LAB | Age: 73
End: 2023-06-13
Attending: FAMILY MEDICINE
Payer: COMMERCIAL

## 2023-06-13 DIAGNOSIS — R97.20 ELEVATED PSA, LESS THAN 10 NG/ML: ICD-10-CM

## 2023-06-13 DIAGNOSIS — Z00.00 ROUTINE PHYSICAL EXAMINATION: ICD-10-CM

## 2023-06-13 DIAGNOSIS — E11.9 TYPE 2 DIABETES MELLITUS WITHOUT COMPLICATION, WITHOUT LONG-TERM CURRENT USE OF INSULIN (HCC): ICD-10-CM

## 2023-06-13 DIAGNOSIS — N28.9 MILD RENAL INSUFFICIENCY: ICD-10-CM

## 2023-06-13 DIAGNOSIS — R97.20 ELEVATED PSA, LESS THAN 10 NG/ML: Primary | ICD-10-CM

## 2023-06-13 LAB
ALBUMIN SERPL-MCNC: 3.7 G/DL (ref 3.4–5)
ALBUMIN/GLOB SERPL: 1 {RATIO} (ref 1–2)
ALP LIVER SERPL-CCNC: 67 U/L
ALT SERPL-CCNC: 19 U/L
ANION GAP SERPL CALC-SCNC: 7 MMOL/L (ref 0–18)
AST SERPL-CCNC: 17 U/L (ref 15–37)
BASOPHILS # BLD AUTO: 0.03 X10(3) UL (ref 0–0.2)
BASOPHILS NFR BLD AUTO: 0.7 %
BILIRUB SERPL-MCNC: 0.4 MG/DL (ref 0.1–2)
BUN BLD-MCNC: 20 MG/DL (ref 7–18)
BUN/CREAT SERPL: 17.7 (ref 10–20)
CALCIUM BLD-MCNC: 9.2 MG/DL (ref 8.5–10.1)
CHLORIDE SERPL-SCNC: 108 MMOL/L (ref 98–112)
CHOLEST SERPL-MCNC: 107 MG/DL (ref ?–200)
CO2 SERPL-SCNC: 24 MMOL/L (ref 21–32)
COMPLEXED PSA SERPL-MCNC: 5.11 NG/ML (ref ?–4)
CREAT BLD-MCNC: 1.13 MG/DL
DEPRECATED RDW RBC AUTO: 41.7 FL (ref 35.1–46.3)
EOSINOPHIL # BLD AUTO: 0.17 X10(3) UL (ref 0–0.7)
EOSINOPHIL NFR BLD AUTO: 3.8 %
ERYTHROCYTE [DISTWIDTH] IN BLOOD BY AUTOMATED COUNT: 14 % (ref 11–15)
EST. AVERAGE GLUCOSE BLD GHB EST-MCNC: 146 MG/DL (ref 68–126)
FASTING PATIENT LIPID ANSWER: YES
FASTING STATUS PATIENT QL REPORTED: YES
GFR SERPLBLD BASED ON 1.73 SQ M-ARVRAT: 69 ML/MIN/1.73M2 (ref 60–?)
GLOBULIN PLAS-MCNC: 3.6 G/DL (ref 2.8–4.4)
GLUCOSE BLD-MCNC: 84 MG/DL (ref 70–99)
HBA1C MFR BLD: 6.7 % (ref ?–5.7)
HCT VFR BLD AUTO: 39.7 %
HDLC SERPL-MCNC: 40 MG/DL (ref 40–59)
HGB BLD-MCNC: 12.6 G/DL
IMM GRANULOCYTES # BLD AUTO: 0.03 X10(3) UL (ref 0–1)
IMM GRANULOCYTES NFR BLD: 0.7 %
LDLC SERPL CALC-MCNC: 49 MG/DL (ref ?–100)
LYMPHOCYTES # BLD AUTO: 1.2 X10(3) UL (ref 1–4)
LYMPHOCYTES NFR BLD AUTO: 26.7 %
MCH RBC QN AUTO: 25.9 PG (ref 26–34)
MCHC RBC AUTO-ENTMCNC: 31.7 G/DL (ref 31–37)
MCV RBC AUTO: 81.5 FL
MONOCYTES # BLD AUTO: 0.44 X10(3) UL (ref 0.1–1)
MONOCYTES NFR BLD AUTO: 9.8 %
NEUTROPHILS # BLD AUTO: 2.62 X10 (3) UL (ref 1.5–7.7)
NEUTROPHILS # BLD AUTO: 2.62 X10(3) UL (ref 1.5–7.7)
NEUTROPHILS NFR BLD AUTO: 58.3 %
NONHDLC SERPL-MCNC: 67 MG/DL (ref ?–130)
OSMOLALITY SERPL CALC.SUM OF ELEC: 290 MOSM/KG (ref 275–295)
PLATELET # BLD AUTO: 202 10(3)UL (ref 150–450)
POTASSIUM SERPL-SCNC: 4.5 MMOL/L (ref 3.5–5.1)
PROT SERPL-MCNC: 7.3 G/DL (ref 6.4–8.2)
PROT UR-MCNC: 10.9 MG/DL
PSA SERPL-MCNC: 5.11 NG/ML (ref ?–4)
RBC # BLD AUTO: 4.87 X10(6)UL
SODIUM SERPL-SCNC: 139 MMOL/L (ref 136–145)
TRIGL SERPL-MCNC: 96 MG/DL (ref 30–149)
TSI SER-ACNC: 2.61 MIU/ML (ref 0.36–3.74)
VLDLC SERPL CALC-MCNC: 14 MG/DL (ref 0–30)
WBC # BLD AUTO: 4.5 X10(3) UL (ref 4–11)

## 2023-06-13 PROCEDURE — 84443 ASSAY THYROID STIM HORMONE: CPT

## 2023-06-13 PROCEDURE — 85025 COMPLETE CBC W/AUTO DIFF WBC: CPT

## 2023-06-13 PROCEDURE — 84153 ASSAY OF PSA TOTAL: CPT

## 2023-06-13 PROCEDURE — 3044F HG A1C LEVEL LT 7.0%: CPT | Performed by: FAMILY MEDICINE

## 2023-06-13 PROCEDURE — 80061 LIPID PANEL: CPT

## 2023-06-13 PROCEDURE — 84156 ASSAY OF PROTEIN URINE: CPT

## 2023-06-13 PROCEDURE — 80053 COMPREHEN METABOLIC PANEL: CPT

## 2023-06-13 PROCEDURE — 36415 COLL VENOUS BLD VENIPUNCTURE: CPT

## 2023-06-13 PROCEDURE — 83036 HEMOGLOBIN GLYCOSYLATED A1C: CPT

## 2023-08-03 ENCOUNTER — PATIENT MESSAGE (OUTPATIENT)
Dept: FAMILY MEDICINE CLINIC | Facility: CLINIC | Age: 73
End: 2023-08-03

## 2023-08-03 NOTE — TELEPHONE ENCOUNTER
From: Lakeisha Garibay  To:  Yulisa Vasquez,   Sent: 8/3/2023 12:04 PM CDT  Subject: COVID-19    How long do should you quarantine after testing positive for Covid

## 2023-08-04 ENCOUNTER — NURSE TRIAGE (OUTPATIENT)
Dept: FAMILY MEDICINE CLINIC | Facility: CLINIC | Age: 73
End: 2023-08-04

## 2023-08-04 NOTE — TELEPHONE ENCOUNTER
Pt stated that he tested positive for covid on 7/29/23 home covid test.This is when he started to have a cough, congestion and diarrhea and believes he also had a fever as he felt warm and had chills. Pt took another covid test today and its still positive. Pt stated that his symptoms are getting better then last Friday. Pt denied chest pain does feel some sob with activity only. Pt was wanting to get a PCR test done. Pt was inform since he tested positive for covid on July 29, 2023 then he is positive and we do not recommend he retest as it can continue to be positive for several weeks. Pt was inform I can give him a video  visit to discuss his current s/sx Pt stated that he is doing better then last Friday so his symptoms are improving. Pt was inform he will needs to continue to wear a mask if he goes out for days 6-10 as long as he has no fever and his s/sx are improving. Pt verbalized understanding and will continue to monitor his s/sx. Pt is pushing the fluids.     Reason for Disposition   [1] HIGH RISK for severe COVID complications (e.g., weak immune system, age > 59 years, obesity with BMI of 30 or higher, pregnant, chronic lung disease or other chronic medical condition) AND [2] COVID symptoms (e.g., cough, fever)  (Exceptions: Already seen by PCP and no new or worsening symptoms.)    Protocols used: Coronavirus (COVID-19) Diagnosed or [id number]

## 2023-08-31 ENCOUNTER — OFFICE VISIT (OUTPATIENT)
Dept: SURGERY | Facility: CLINIC | Age: 73
End: 2023-08-31

## 2023-08-31 VITALS — HEART RATE: 54 BPM | SYSTOLIC BLOOD PRESSURE: 147 MMHG | DIASTOLIC BLOOD PRESSURE: 87 MMHG

## 2023-08-31 DIAGNOSIS — R82.90 URINE ABNORMALITY: ICD-10-CM

## 2023-08-31 DIAGNOSIS — R97.20 ELEVATED PSA: Primary | ICD-10-CM

## 2023-08-31 LAB
BILIRUBIN: NEGATIVE
GLUCOSE (URINE DIPSTICK): NEGATIVE MG/DL
KETONES (URINE DIPSTICK): NEGATIVE MG/DL
LEUKOCYTES: NEGATIVE
MULTISTIX LOT#: ABNORMAL NUMERIC
NITRITE, URINE: NEGATIVE
PH, URINE: 5 (ref 4.5–8)
PROTEIN (URINE DIPSTICK): NEGATIVE MG/DL
SPECIFIC GRAVITY: 1.02 (ref 1–1.03)
UROBILINOGEN,SEMI-QN: 0.2 MG/DL (ref 0–1.9)

## 2023-08-31 PROCEDURE — 99204 OFFICE O/P NEW MOD 45 MIN: CPT | Performed by: UROLOGY

## 2023-08-31 PROCEDURE — 3077F SYST BP >= 140 MM HG: CPT | Performed by: UROLOGY

## 2023-08-31 PROCEDURE — 3079F DIAST BP 80-89 MM HG: CPT | Performed by: UROLOGY

## 2023-08-31 PROCEDURE — 81002 URINALYSIS NONAUTO W/O SCOPE: CPT | Performed by: UROLOGY

## 2023-10-09 ENCOUNTER — HOSPITAL ENCOUNTER (OUTPATIENT)
Dept: MRI IMAGING | Facility: HOSPITAL | Age: 73
Discharge: HOME | End: 2023-10-09
Attending: UROLOGY
Payer: COMMERCIAL

## 2023-10-09 DIAGNOSIS — R97.20 ELEVATED PSA: ICD-10-CM

## 2023-10-09 PROCEDURE — 72197 MRI PELVIS W/O & W/DYE: CPT | Performed by: UROLOGY

## 2023-10-09 PROCEDURE — A9575 INJ GADOTERATE MEGLUMI 0.1ML: HCPCS | Performed by: UROLOGY

## 2023-10-09 RX ORDER — GADOTERATE MEGLUMINE 376.9 MG/ML
20 INJECTION INTRAVENOUS
Status: CANCELLED | OUTPATIENT
Start: 2023-10-09

## 2023-10-09 RX ORDER — GADOTERATE MEGLUMINE 376.9 MG/ML
20 INJECTION INTRAVENOUS
Status: COMPLETED | OUTPATIENT
Start: 2023-10-09 | End: 2023-10-09

## 2023-10-09 RX ADMIN — GADOTERATE MEGLUMINE 18 ML: 376.9 INJECTION INTRAVENOUS at 08:24:00

## 2023-10-10 DIAGNOSIS — I10 ESSENTIAL HYPERTENSION: ICD-10-CM

## 2023-10-11 RX ORDER — LISINOPRIL 10 MG/1
10 TABLET ORAL DAILY
Qty: 90 TABLET | Refills: 3 | Status: SHIPPED | OUTPATIENT
Start: 2023-10-11

## 2023-10-11 NOTE — TELEPHONE ENCOUNTER
Please review. Protocol failed or has no protocol.     Requested Prescriptions   Pending Prescriptions Disp Refills    LISINOPRIL 10 MG Oral Tab [Pharmacy Med Name: LISINOPRIL 10MG TABLETS] 90 tablet 1     Sig: TAKE 1 TABLET(10 MG) BY MOUTH DAILY       Hypertensive Medications Protocol Failed - 10/10/2023  7:11 AM        Failed - Last BP reading less than 140/90     BP Readings from Last 1 Encounters:  08/31/23 : 147/87              Passed - In person appointment in the past 12 or next 3 months     Recent Outpatient Visits              1 month ago Elevated PSA    [id number] [facility], Bailey Del Valle MD    Office Visit    4 months ago Routine physical examination    [id number] [facility], [place], [place],     Office Visit    1 year ago Screen for colon cancer    Bárbara Painter, [address], [place]    Nurse Only    1 year ago Type 2 diabetes mellitus without complication, without long-term current use of insulin ([facility] [address] [address]    Padilla Juarez , [place], [place]    Office Visit    2 years ago Type 2 diabetes mellitus without complication, without long-term current use of insulin ([address]    Padilla Juarez , [place], [place]    Office Visit          Future Appointments         Provider Department Appt Notes    In 2 months DO Bárbara Henry [address], [place] 6 mon f/u                      Passed - CMP or BMP in past 6 months     Recent Results (from the past 4392 hour(s))   Comp Metabolic Panel (14) [E]    Collection Time: 06/13/23 10:47 AM   Result Value Ref Range    Glucose 84 70 - 99 mg/dL    Sodium 139 136 - 145 mmol/L    Potassium 4.5 3.5 - 5.1 mmol/L    Chloride 108 98 - 112 mmol/L    CO2 24.0 21.0 - 32.0 mmol/L    Anion Gap 7 0 - 18 mmol/L    BUN 20 (H) 7 - 18 mg/dL    Creatinine 1.13 0.70 - 1.30 mg/dL    BUN/CREA Ratio 17.7 10.0 - 20.0    Calcium, Total 9.2 8.5 - 10.1 mg/dL    Calculated Osmolality 290 275 - 295 mOsm/kg    eGFR-Cr 69 >=60 mL/min/1.73m2    ALT 19 16 - 61 U/L    AST 17 15 - 37 U/L    Alkaline Phosphatase 67 45 - 117 U/L    Bilirubin, Total 0.4 0.1 - 2.0 mg/dL    Total Protein 7.3 6.4 - 8.2 g/dL    Albumin 3.7 3.4 - 5.0 g/dL    Globulin  3.6 2.8 - 4.4 g/dL    A/G Ratio 1.0 1.0 - 2.0    Patient Fasting for CMP? Yes      *Note: Due to a large number of results and/or encounters for the requested time period, some results have not been displayed. A complete set of results can be found in Results Review.                Passed - In person appointment or virtual visit in the past 6 months     Recent Outpatient Visits              1 month ago Elevated PSA    Bc Gayle MD    Office Visit    4 months ago Routine physical examination    Janak Gayle Donna Luo, DO    Office Visit    1 year ago Screen for colon cancer    [address], [address], [place]    Nurse Only    1 year ago Type 2 diabetes mellitus without complication, without long-term current use of insulin [facility])    [address], [place] [address], [place], [place]    Office Visit    2 years ago Type 2 diabetes mellitus without complication, without long-term current use of insulin [facility])    [address], [place] [address], [facility],     Office Visit          Future Appointments         Provider Department Appt Notes    In 2 months Juan Robles DO [address], [facility], [facility] 6 mon f/u                      Passed - EGFRCR or GFRAA > 50     GFR Evaluation  EGFRCR: 69 , resulted on 6/13/2023               Recent Outpatient Visits              1 month ago Elevated PSA    Bc Gayle MD Office Visit    4 months ago Routine physical examination    [address] [facility], [place], [place], [place]    Office Visit    1 year ago Screen for colon cancer    [address] [facility], [place]    Nurse Only    1 year ago Type 2 diabetes mellitus without complication, without long-term current use of insulin ([facility]    [address], [address], [place], [place]    Office Visit    2 years ago Type 2 diabetes mellitus without complication, without long-term current use of insulin [facility])    [address], [address], [facility],     Office Visit            Future Appointments         Provider Department Appt Notes    In 2 months Elsa Baca DO [address], [address], [place] 6 mon f/u

## 2023-10-24 ENCOUNTER — PATIENT MESSAGE (OUTPATIENT)
Dept: FAMILY MEDICINE CLINIC | Facility: CLINIC | Age: 73
End: 2023-10-24

## 2023-10-25 DIAGNOSIS — R97.20 ELEVATED PSA: Primary | ICD-10-CM

## 2023-10-25 RX ORDER — CIPROFLOXACIN 500 MG/1
500 TABLET, FILM COATED ORAL 2 TIMES DAILY
Qty: 2 TABLET | Refills: 0 | Status: SHIPPED | OUTPATIENT
Start: 2023-10-25

## 2023-10-25 RX ORDER — CEFDINIR 300 MG/1
300 CAPSULE ORAL EVERY 12 HOURS
Qty: 2 CAPSULE | Refills: 0 | Status: SHIPPED | OUTPATIENT
Start: 2023-10-25 | End: 2023-10-28

## 2023-10-25 NOTE — TELEPHONE ENCOUNTER
Dr. Ferris First, patient had MRI prostate on 10/9/23 ordered by Dr. Queen Reyes. Patient has also asked Dr. Queen Reeys to review MRI.

## 2023-10-25 NOTE — TELEPHONE ENCOUNTER
From: Maria Eugenia Segura  To:  Li Dodd  Sent: 10/24/2023 3:36 PM CDT  Subject: MRI tests results from my prostate test    Can you give me a plain sense result from my MRI please

## 2023-10-26 ENCOUNTER — TELEPHONE (OUTPATIENT)
Dept: SURGERY | Facility: CLINIC | Age: 73
End: 2023-10-26

## 2023-10-26 NOTE — TELEPHONE ENCOUNTER
- s/w Domitila Neff, pharmacist, they noted that pt taking Glimepride, and in combo with the abx, his blood sugar could drop, so Domitila Neff was concerned that pt was having major surgery, I explained that the procedure he was having did not involve anesthesia, and that they should just instruct the pt to monitor his blood sugar closely on day of his procedure. Domitila Neff agreed that they would flag his order that he would have to receive instructions from pharmacist prior to picking up medication.     - encounter complete

## 2023-10-26 NOTE — TELEPHONE ENCOUNTER
Kuldip Altamirano from [facility]'s pharmacist calling regarding drug interaction on medication for cipro  Please advise

## 2023-10-26 NOTE — TELEPHONE ENCOUNTER
- s/w pharmacist Benito Lewis; pt name and  verified  - instructions for cefdinir order was to take it for 3 days, but qty was 2 capsules, and he was to start day of the procedure. Per Dr. Raymond Pozo protocol for bx, pt is to take 1 capsule every 12 hours, totaling 2 capsules, the day of the procedure, NOT for 3 days, only one day. Pharmacist Benito Lewis acknowledged understanding and agreed to change the description.

## 2023-10-27 DIAGNOSIS — E11.9 TYPE 2 DIABETES MELLITUS WITHOUT COMPLICATION, WITHOUT LONG-TERM CURRENT USE OF INSULIN (HCC): ICD-10-CM

## 2023-10-28 RX ORDER — METFORMIN HYDROCHLORIDE 500 MG/1
500 TABLET, EXTENDED RELEASE ORAL 2 TIMES DAILY WITH MEALS
Qty: 180 TABLET | Refills: 3 | Status: SHIPPED | OUTPATIENT
Start: 2023-10-28

## 2023-10-28 NOTE — TELEPHONE ENCOUNTER
Refill passed per [facility] protocol. Requested Prescriptions   Pending Prescriptions Disp Refills    metFORMIN  MG Oral Tablet 24 Hr 180 tablet 1     Sig: Take 1 tablet (500 mg total) by mouth 2 (two) times daily with meals.        Diabetes Medication Protocol Passed - 10/27/2023 10:36 AM        Passed - Last A1C < 7.5 and within past 6 months     Lab Results   Component Value Date    A1C 6.7 (H) 06/13/2023             Passed - In person appointment or virtual visit in the past 6 mos or appointment in next 3 mos     Recent Outpatient Visits              1 month ago Elevated PSA    [address] [facility], Beryl Interiano MD    Office Visit    4 months ago Routine physical examination    [address] [facility], [place], Vicenta Rodriguez     Office Visit    1 year ago Screen for colon cancer    [address], [address], [place]    Nurse Only    1 year ago Type 2 diabetes mellitus without complication, without long-term current use of insulin ([facility]    [address], [facility], [place], [place]    Office Visit    2 years ago Type 2 diabetes mellitus without complication, without long-term current use of insulin [facility])    [address], [facility], [place], [place]    Office Visit          Future Appointments         Provider Department Appt Notes    In 1 month Vicenta Bhat,  [address], [facility], [facility] 6 mon f/u                      Passed - EGFRCR or GFRAA > 50     GFR Evaluation  EGFRCR: 69 , resulted on 6/13/2023          Passed - GFR in the past 12 months              Future Appointments         Provider Department Appt Notes    In 1 month [place] March, DO [address], [facility], [facility] 6 mon f/u             Recent Outpatient Visits              1 month ago Elevated PSA    [employer] Elton Jaquez MD    Office Visit    4 months ago Routine physical examination    [address] [place] [address], Humberto Ott DO    Office Visit    1 year ago Screen for colon cancer    Nicole Hartman, [address], [employer]    Nurse Only    1 year ago Type 2 diabetes mellitus without complication, without long-term current use of insulin [facility])    Pad[address] Serrano , [place], Humberto Ott [place]    Office Visit    2 years ago Type 2 diabetes mellitus without complication, without long-term current use of insulin [facility])    [address], [place], Humberto [place], [place]    Office Visit

## 2023-10-30 ENCOUNTER — TELEPHONE (OUTPATIENT)
Dept: SURGERY | Facility: CLINIC | Age: 73
End: 2023-10-30

## 2023-10-30 DIAGNOSIS — R97.20 ELEVATED PSA: Primary | ICD-10-CM

## 2023-10-30 NOTE — TELEPHONE ENCOUNTER
----- Message from XANDER [facility] sent at 10/25/2023  9:25 PM CDT -----  Please let patient know his MRI shows 2 lesions in the prostate, these are indeterminate. Meaning they are not strongly suspicious for malignancy however can not confidently say they are benign appearing either. Typically the recommendation would be to proceed with a uronav targeted prostate biopsy. Minimal risk of bleeding, infection, and urinary retention. If he would like to proceed, please forward to [facility] for scheduling. Orders placed. If he does not want to proceed with biopsy I would recommend a repeat PSA in 6 months with a follow up at that time.

## 2023-11-01 NOTE — TELEPHONE ENCOUNTER
Pt returned call and I confirmed his full name and . I read the patient Tamika's message and he decided he did not want to do a biopsy at this point, but repeat his PSA in 6 months. I made an appointment with Dr. Effie Nicholson for him and put in an order for a PSA. I asked the patient to get his PSA lab drawn the week before his appointment and he verbalized his understanding.

## 2023-11-18 DIAGNOSIS — E11.9 TYPE 2 DIABETES MELLITUS WITHOUT COMPLICATION, WITHOUT LONG-TERM CURRENT USE OF INSULIN (HCC): ICD-10-CM

## 2023-11-20 NOTE — TELEPHONE ENCOUNTER
Refill passed per [place] [facility] protocol. Please review pended refill request as unable to refill due to high/very high drug interaction warning copied here:    High  Drug-Drug: glimepiride and ciprofloxacinThe hypoglycemic effect of Sulfonylureas may be increased by Quinolones especially in elderly patients with renal compromise. Hypoglycemia symptoms including lightheadedness, diaphoresis, tachycardia and various neurologic and psychiatric disturbances may occur.       Requested Prescriptions   Pending Prescriptions Disp Refills    GLIMEPIRIDE 1 MG Oral Tab [Pharmacy Med Name: GLIMEPIRIDE 1MG TABLETS] 180 tablet 1     Sig: TAKE 1 TABLET(1 MG) BY MOUTH TWICE DAILY WITH MEALS       Diabetes Medication Protocol Passed - 11/18/2023  6:29 PM        Passed - Last A1C < 7.5 and within past 6 months     Lab Results   Component Value Date    A1C 6.7 (H) 06/13/2023             Passed - In person appointment or virtual visit in the past 6 mos or appointment in next 3 mos     Recent Outpatient Visits              2 months ago Elevated PSA    Pooja Diaz MD    Office Visit    5 months ago Routine physical examination    Janak Diaz Cecelia Redhead, DO    Office Visit    1 year ago Screen for colon cancer    [address], [address], [place]    Nurse Only    1 year ago Type 2 diabetes mellitus without complication, without long-term current use of insulin ([facility]    [address], [place] [address], [place], [place]    Office Visit    2 years ago Type 2 diabetes mellitus without complication, without long-term current use of insulin [facility])    [address], [facility], [place], [place]    Office Visit          Future Appointments         Provider Department Appt Notes    In 3 weeks DO Jose Wright, [place] 6 mon f/u    In 5 months Suad Spain MD [employer], [address], [place] 6 month follow up, pt will get PSA level done prior to appointment               Passed - EGFRCR or GFRAA > 50     GFR Evaluation  EGFRCR: 69 , resulted on 6/13/2023          Passed - GFR in the past 12 months           Future Appointments         Provider Department Appt Notes    In 3 weeks Wong Courtney DO [address], [address], [place] 6 mon f/u    In 5 months Suad Spain MD [address], [address], [place] 6 month follow up, pt will get PSA level done prior to appointment          Recent Outpatient Visits              2 months ago Elevated PSA    Lizzy Rivas, Lavern Olsen MD    Office Visit    5 months ago Routine physical examination    Lizzy Rivas, Liv Briceno DO    Office Visit    1 year ago Screen for colon cancer    [address], [address], [place]    Nurse Only    1 year ago Type 2 diabetes mellitus without complication, without long-term current use of insulin [facility])    [address], [address], [place], [place]    Office Visit    2 years ago Type 2 diabetes mellitus without complication, without long-term current use of insulin [facility])    [address], [address], [place], [place]    Office Visit

## 2023-11-22 RX ORDER — GLIMEPIRIDE 1 MG/1
1 TABLET ORAL 2 TIMES DAILY
Qty: 180 TABLET | Refills: 3 | Status: SHIPPED | OUTPATIENT
Start: 2023-11-22

## 2023-11-22 NOTE — TELEPHONE ENCOUNTER
Pt is scheduled 12-11 for followup w/ Dr Vamsi Garcia; is this soon enough?  No sooner availability right now

## 2023-12-11 ENCOUNTER — OFFICE VISIT (OUTPATIENT)
Dept: FAMILY MEDICINE CLINIC | Facility: CLINIC | Age: 73
End: 2023-12-11
Payer: COMMERCIAL

## 2023-12-11 VITALS
BODY MASS INDEX: 26.13 KG/M2 | HEIGHT: 70 IN | TEMPERATURE: 98 F | WEIGHT: 182.5 LBS | HEART RATE: 58 BPM | DIASTOLIC BLOOD PRESSURE: 80 MMHG | SYSTOLIC BLOOD PRESSURE: 148 MMHG

## 2023-12-11 DIAGNOSIS — E78.5 HYPERLIPIDEMIA, UNSPECIFIED HYPERLIPIDEMIA TYPE: ICD-10-CM

## 2023-12-11 DIAGNOSIS — Z28.21 INFLUENZA VACCINATION DECLINED BY PATIENT: ICD-10-CM

## 2023-12-11 DIAGNOSIS — R97.20 ELEVATED PSA, LESS THAN 10 NG/ML: ICD-10-CM

## 2023-12-11 DIAGNOSIS — I10 ESSENTIAL HYPERTENSION: ICD-10-CM

## 2023-12-11 DIAGNOSIS — Z28.21 PNEUMOCOCCAL VACCINATION DECLINED BY PATIENT: ICD-10-CM

## 2023-12-11 DIAGNOSIS — E11.9 TYPE 2 DIABETES MELLITUS WITHOUT COMPLICATION, WITHOUT LONG-TERM CURRENT USE OF INSULIN (HCC): Primary | ICD-10-CM

## 2023-12-11 LAB
CARTRIDGE LOT#: 634 NUMERIC
HEMOGLOBIN A1C: 6.6 % (ref 4.3–5.6)

## 2023-12-11 NOTE — PATIENT INSTRUCTIONS
Keep appointment with urology regarding slight elevated PSA. Medication reviewed and renewed where needed and appropriate. Comply with medications. Monitor blood pressures and record at home. Limit salt intake. Encouraged safe physical fitness and daily physical activity daily.

## 2023-12-12 NOTE — PROGRESS NOTES
Subjective:     Patient ID: Tommy Sellers is a 68year old male. This patient is a 71-year-old hypertensive/diabetic -American male who is here for status update regarding the response to treatment for these chronic illnesses. Patient currently denying headaches, chest pain, dizziness, shortness of breath, visual changes, urinary frequency, exertional fatigue, excessive thirst and/or hunger. Patient eligible for seasonal flu vaccine and also pneumococcal vaccine but declines them both. Patient still under the care of urology regarding the PSA elevations. At this time observation for PSA velocity has been recommended by urology. History/Other:   Review of Systems  Current Outpatient Medications   Medication Sig Dispense Refill    glimepiride 1 MG Oral Tab Take 1 tablet (1 mg total) by mouth 2 (two) times daily. 180 tablet 3    metFORMIN  MG Oral Tablet 24 Hr Take 1 tablet (500 mg total) by mouth 2 (two) times daily with meals. 180 tablet 3    ciprofloxacin 500 MG Oral Tab Take 1 tablet (500 mg total) by mouth 2 (two) times daily. Start the morning of your procedure. 2 tablet 0    lisinopril 10 MG Oral Tab Take 1 tablet (10 mg total) by mouth daily. 90 tablet 3    ROSUVASTATIN 5 MG Oral Tab TAKE 1 TABLET(5 MG) BY MOUTH EVERY NIGHT 90 tablet 1     Allergies:No Known Allergies    Past Medical History:   Diagnosis Date    Diabetes ([address]     High blood pressure     High cholesterol       Past Surgical History:   Procedure Laterality Date    COLONOSCOPY N/A 10/6/2022    Procedure: COLONOSCOPY;  Surgeon: Donn Comer MD;  Location: [facility] ENDO      History reviewed. No pertinent family history.    Social History:   Social History     Socioeconomic History    Marital status:    Tobacco Use    Smoking status: Former    Smokeless tobacco: Never    Tobacco comments:     quit 10 yrs ago   Vaping Use    Vaping Use: Never used   Substance and Sexual Activity    Alcohol use: Not Currently Comment: social    Drug use: Not Currently        Objective:   Vitals:    12/11/23 1726   BP: 148/80   Pulse:    Temp:        Physical Exam  Constitutional:       Appearance: Normal appearance. He is not ill-appearing. HENT:      Head: Normocephalic and atraumatic. Right Ear: Tympanic membrane normal.      Left Ear: Tympanic membrane normal.      Nose: Nose normal.   Neck:      Thyroid: No thyromegaly. Cardiovascular:      Rate and Rhythm: Normal rate and regular rhythm. Heart sounds:      No gallop. Pulmonary:      Effort: Pulmonary effort is normal.      Breath sounds: Normal breath sounds. Neurological:      General: No focal deficit present. Mental Status: He is alert and oriented to person, place, and time. Assessment & Plan:   1. Type 2 diabetes mellitus without complication, without long-term current use of insulin (HCC)  The following labs have been ordered and today's A1c is at 6.6; excellent control  - POC Glycohemoglobin [38249]  - Microalb/Creat Ratio, Random Urine [E]; Future  - Diabetic Retinopathy Exam  OU - Both Eyes; Future  - Hemoglobin A1C; Standing  - Hemoglobin A1C    2. Essential hypertension  Blood pressure measures to goal when measured manually by physician. 3. Hyperlipidemia, unspecified hyperlipidemia type  Status to be updated on next blood draw visit. 4. Elevated PSA, less than 10 ng/ml  Patient remains under the care of the urologist who for now is watching the PSAs for velocity. Patient has an upcoming appointment with urologist.    5. Pneumococcal vaccination declined by patient  Declined by patient. 6. Influenza vaccination declined by patient  Declined by patient.           Orders Placed This Encounter   Procedures    POC Glycohemoglobin [62449]    Microalb/Creat Ratio, Random Urine [E]    Hemoglobin A1C    High Dose Fluzone 65 yr and older PFS [46927]    Prevnar 20 (PCV20) [03507]    Diabetic Retinopathy Exam  OU - Both Eyes       Meds This Visit:  Requested Prescriptions      No prescriptions requested or ordered in this encounter       Imaging & Referrals:  FLU VACC HIGH DOSE PRSV FREE  PCV20 VACCINE FOR INTRAMUSCULAR USE     Patient Instructions   Keep appointment with urology regarding slight elevated PSA. Medication reviewed and renewed where needed and appropriate. Comply with medications. Monitor blood pressures and record at home. Limit salt intake. Encouraged safe physical fitness and daily physical activity daily. Return in about 4 months (around 4/11/2024), or if symptoms worsen or fail to improve.

## 2023-12-26 ENCOUNTER — NURSE ONLY (OUTPATIENT)
Dept: INTERNAL MEDICINE CLINIC | Facility: CLINIC | Age: 73
End: 2023-12-26
Payer: COMMERCIAL

## 2023-12-26 DIAGNOSIS — E11.9 TYPE 2 DIABETES MELLITUS WITHOUT COMPLICATION, WITHOUT LONG-TERM CURRENT USE OF INSULIN (HCC): ICD-10-CM

## 2023-12-26 PROCEDURE — 92229 IMG RTA DETC/MNTR DS POC ALY: CPT | Performed by: FAMILY MEDICINE

## 2023-12-26 PROCEDURE — 2033F EYE IMG VALID W/O RTNOPTHY: CPT | Performed by: FAMILY MEDICINE

## 2024-04-11 ENCOUNTER — PATIENT MESSAGE (OUTPATIENT)
Dept: FAMILY MEDICINE CLINIC | Facility: CLINIC | Age: 74
End: 2024-04-11

## 2024-04-11 NOTE — TELEPHONE ENCOUNTER
From: Jean Paul Donis  To: Josue Bryant  Sent: 4/11/2024 11:28 AM CDT  Subject: PSA test and Microlb/create ratio test    Do I need to make an appointment at the lab to do the tests or can I just go and take the tests. I think it’s giving urine to get the results

## 2024-04-12 ENCOUNTER — LAB ENCOUNTER (OUTPATIENT)
Dept: LAB | Facility: REFERENCE LAB | Age: 74
End: 2024-04-12
Attending: FAMILY MEDICINE
Payer: COMMERCIAL

## 2024-04-12 DIAGNOSIS — E11.9 TYPE 2 DIABETES MELLITUS WITHOUT COMPLICATION, WITHOUT LONG-TERM CURRENT USE OF INSULIN (HCC): ICD-10-CM

## 2024-04-12 DIAGNOSIS — R97.20 ELEVATED PSA: ICD-10-CM

## 2024-04-12 LAB
CREAT UR-SCNC: 56 MG/DL
MICROALBUMIN UR-MCNC: 0.6 MG/DL
MICROALBUMIN/CREAT 24H UR-RTO: 10.7 UG/MG (ref ?–30)
PSA FREE MFR SERPL: 25 %
PSA FREE SERPL-MCNC: 1.32 NG/ML
PSA SERPL-MCNC: 5.24 NG/ML (ref ?–4)

## 2024-04-12 PROCEDURE — 82043 UR ALBUMIN QUANTITATIVE: CPT

## 2024-04-12 PROCEDURE — 84154 ASSAY OF PSA FREE: CPT

## 2024-04-12 PROCEDURE — 84153 ASSAY OF PSA TOTAL: CPT

## 2024-04-12 PROCEDURE — 36415 COLL VENOUS BLD VENIPUNCTURE: CPT

## 2024-04-12 PROCEDURE — 82570 ASSAY OF URINE CREATININE: CPT

## 2024-04-29 ENCOUNTER — OFFICE VISIT (OUTPATIENT)
Dept: SURGERY | Facility: CLINIC | Age: 74
End: 2024-04-29
Payer: COMMERCIAL

## 2024-04-29 DIAGNOSIS — R97.20 ELEVATED PSA: Primary | ICD-10-CM

## 2024-04-29 PROCEDURE — 99214 OFFICE O/P EST MOD 30 MIN: CPT | Performed by: UROLOGY

## 2024-04-29 RX ORDER — CIPROFLOXACIN 500 MG/1
500 TABLET, FILM COATED ORAL 2 TIMES DAILY
Qty: 2 TABLET | Refills: 0 | Status: SHIPPED | OUTPATIENT
Start: 2024-04-29 | End: 2024-04-30

## 2024-04-29 RX ORDER — CEFDINIR 300 MG/1
300 CAPSULE ORAL EVERY 12 HOURS
Qty: 2 CAPSULE | Refills: 0 | Status: SHIPPED | OUTPATIENT
Start: 2024-04-29 | End: 2024-04-30

## 2024-04-29 NOTE — DISCHARGE INSTRUCTIONS
Performed by  __________________.    1. Hematuria (blood in urine) and/or blood in your bowel movement    Usually minimal-lasting 24 hours is expected  Hydrating with additional fluids such as water an juices is necessary to minimize hematuria.  Blood may also be present in stool after bowel movement. Avoid straining during elimination, avoid constipating food items.  Call ordering doctor if bleeding persists or worsens, such as a darker in appearance or thicker in consistency.    2. Activity  Rest quietly for the next 24 hours. Avoid straining, lifting heavy items, or strenuous physical activity for approximately 2 days.    3. Infection  Continue with antibiotics as ordered. If further signs or symptoms of infection occur such as:  fever (temp greater than 100) or severe discomfort persist, call the ordering doctor. Use tylenol for discomfort. Avoid aspirin, NSAIDs, and Ibuprofen products for 48 hours.    4.  Diet  No dietary restrictions except food products that cause constipation.Hydrate well.    5.  Results  Call/follow-up with ordering doctor for results, usually 5-7 days.      THANK YOU, WE WISH YOU WELL.

## 2024-04-29 NOTE — PROGRESS NOTES
Patient seen in office, scheduled uro ayana fusion prostate biopsy, Friday 05/03/2024, went over pre-op instructions, will  rx today, all questions answered.

## 2024-04-29 NOTE — PROGRESS NOTES
[facility] Urology  Follow-Up Visit    HPI: Jean Paul Donis is a 73 year old male presents for a follow up visit. Patient was last seen on 8/31/23.    INTERVAL HISTORY: Following up regarding elevated PSA levels, up to 5.11 ng/mL 6/13/2023.    Prostate MRI 10/9/2023 revealed a prostate volume 53 cc and 2 prostate lesions in the left PZ mid gland and left TZ base with assessment category of PI-RADS 3.    Recent PSA 4/12/2024 slightly increased to 5.24 ng/mL with 25% free PSA.    He denies any fevers or chills.  No back pain, no unintentional weight loss.  No gross hematuria or dysuria.    IPSS today is 7 (1/4/1/0/0/0/1) with a quality-of-life score of 1.      1.  Elevated PSA  No family history of prostate cancer.     Screening PSA level from 6/13/2023 noted to be elevated at 5.11 ng/mL.  This is increased from 4.47 ng/mL from 6/2022.     No previous PSA levels available on record or on care everywhere.     Patient denies any significant LUTS.  His IPSS is 4 (0/3/0/0/0/0/1) with a quality-of-life score of 1.     No gross hematuria, dysuria, leg pain, or history of nephrolithiasis.     He reports baseline erectile dysfunction and rates the quality of his erections at 50%.  He is no longer sexually active with his wife but occasionally masturbates.     - 8/2023 consult: PVR 1 mL.  Dipstick UA with trace blood.  Send for microscopy.  Obtain prostate MRI.        PAST MEDICAL HISTORY: HTN.  HLD.  DM.     PAST SURGICAL HISTORY: None.     SOCIAL HISTORY:  and has 3 children.  No smoking or illicit drug use.  No alcohol.  Works a desk job.      Family History   History reviewed. No pertinent family history.     Allergies: Patient has no known allergies.    Reviewed past medical, surgical, family, and social history.  Reviewed med list and allergies.      REVIEW OF SYSTEMS:  Pertinent positives and negatives per HPI. A 10-point ROS was performed and is otherwise negative.       EXAM:  There were no  vitals taken for this visit.  Physical Exam  Constitutional:       Appearance: He is well-developed.   HENT:      Head: Normocephalic.   Eyes:      General: No scleral icterus.  Cardiovascular:      Rate and Rhythm: Normal rate.   Pulmonary:      Effort: Pulmonary effort is normal.   Skin:     General: Skin is warm and dry.   Neurological:      Mental Status: He is alert and oriented to person, place, and time.   Psychiatric:         Mood and Affect: Mood normal.         Behavior: Behavior normal.       PATHOLOGY:  No results found.      LABS:      PSA Screen    Latest Ref Rng <=4.00 ng/mL   6/29/2022 4.47 (H)    6/13/2023 5.11 (H)    4/12/2024 5.24 (H)       IMAGING:    Prostate MRI 10/2023:    1. There are 2 prostate lesions in the left posterolateral peripheral zone at mid gland and the left posterolateral transition zone at the base with assessment category of PI-RADS 3: Intermediate (the presence of clinically significant cancer is   equivocal).      2. PSA density is at the upper limits of normal measuring 0.96 ng/mL2.      3. BPH nodules and chronic prostatitis.      4. Findings suggestive of chronic bladder outlet obstruction.       UROLOGY PROCEDURE:  Not performed today.      IMPRESSION:  73 year old -American male with elevated screening PSA levels.    Prostate MRI with 2 PI-RADS 3 lesions in the left PZ and TZ.    PSA levels mildly elevated.    Reviewed with patient at length.  MRI results discussed again.  Recent PSA levels reviewed.    PSA kinetics are not very alarming.  Discussed correlation rate between PI-RADS 3 lesions and clinically significant prostate cancer on targeted biopsy.    Management options reviewed including continued PSA monitoring versus proceeding with a UroNav MRI-US fusion targeted prostate biopsy.    Rationale, approach, benefits, risks, and alternatives were discussed.    Discussed biopsy risks of infection, bleeding, damage to surrounding organs or structures, urinary  retention.    Patient verbalized understanding.  He elects to proceed with a UroNav prostate biopsy.      PLAN:  1.  Schedule for UroNav MRI-US fusion targeted and sextant TRUS prostate biopsy.    Periprocedural care and follow-up instructions provided.    Luis Ocampo MD  4/29/2024

## 2024-05-03 ENCOUNTER — HOSPITAL ENCOUNTER (OUTPATIENT)
Dept: ULTRASOUND IMAGING | Facility: HOSPITAL | Age: 74
Discharge: HOME | End: 2024-05-03
Attending: NURSE PRACTITIONER
Payer: COMMERCIAL

## 2024-05-03 DIAGNOSIS — R97.20 ELEVATED PSA: ICD-10-CM

## 2024-05-03 PROCEDURE — 76942 ECHO GUIDE FOR BIOPSY: CPT | Performed by: UROLOGY

## 2024-05-03 PROCEDURE — 55700 BIOPSY OF PROSTATE,NEEDLE/PUNCH: CPT | Performed by: UROLOGY

## 2024-05-03 NOTE — IMAGING NOTE
PATIENT ARRIVAL TIME: 0642    ULTRASOUND TECH:ROSY    PHYSICIAN TO PERFORM PROCEDURE:AMANDA AHUJA MD    HISTORY TAKEN:ELEVATED PSA, ABNORMAL MRI    ANTIBIOTICS TAKEN: Y    FLEETS ENEMA TAKEN Y      PROCEDURE EXPLAINED:Y    CONSENT OBTAINED:0649    BASE LINE VITAL SIGNS: /68 HR 58 AT 0654    PHYSICIAN ARRIVAL TIME:0700    TIME OUT COMPLETED @:0701    LIDOCAINE INSTILLED UTILIZING 22 G-20 CM CHIBA NEEDLE @ 0706     IMAGES/SCANS CORRELATED: 0708    SAMPLING BEGUN @:0712    CORE SAMPLES WITH 18 G - 25 CM BARD BIOPSY NEEDLE:    SITES:     REGIONS OF INTEREST (RACHEL) X 2, 3 PASSES EACH SITE      RIGHT MID/BASE/APEX,  2 PASSES EACH SITE    LEFT MID/BASE/APEX, 2 PASSES EACH SITE     SAMPLES APPLIED TO TELFA PRIOR TO IMMERSING INTO FORMALIN 10% SOLUTION    SAMPLING COMPLETED: 0718    POST PROCEDURE VITAL SIGNS: /67 HR 66 AT 0721    PATIENT DISCHARGED AT 0731 AFTER AVS INSTRUCTIONS PROVIDED & REVIEWED, PT FEELS WELL    SPECIMENS TO CYTOLOGY/GROSS ROOM AT 0742

## 2024-05-03 NOTE — PROCEDURES
[facility] Urology  Procedure Note  UroNav MRI-US Fusion TRUS-Guided Prostate Needle Biopsy     Jean Paul Donis Patient Status:  Outpatient    1950 MRN [id number]   Location [facility] ULTRASOUND Attending Luis Ocampo MD   Hosp Day # 0 PCP Josue Bryant DO     Jean Paul Donis is a 73 year old male. He was found to have an elevated screening PSA level of 5.24 ng/mL.  Multiparametric MRI of the prostate revealed 2 prostate lesions in the left posterolateral peripheral zone at mid gland and the left posterolateral transition zone at the base with assessment category of PI-RADS 3: Intermediate (the presence of clinically significant cancer is equivocal). He was counseled on his options and elected to undergo a UroNav MRI-US fusion TRUS-guided prostate needle biopsy for further evaluation. All Risks, benefits and alternatives of the procedure were previously explained to the patient and he provided informed consent.      PRE-OP:   Elevated screening PSA level (5.24 ng/mL)  Abnormal Prostate MRI    POST OP:   Elevated screening PSA level (5.24 ng/mL)  Abnormal Prostate MRI    PROCEDURE:   Transrectal ultrasound of the prostate; ultrasound guidance of biopsy; MRI-US fusion guidance of biopsy, transrectal needle biopsy of the prostate; administration of hu-prostatic nerve block.    ANESTHESIA:   1 % Xylocaine solution hu-prostatic nerve block.    FINDING:   Seminal vesicles: WNL   Hypoechoic prostate lesions suspicious for malignancy: not appreciated.    Presence of intravesical median lobe: not appreciated.   Prostate volume on ultrasound: 52.96 cc (W 5 cm x H 3.8 cm x L 5.2 cm)  Number of standard biopsies on right side: 6  Number of standard biopsies on left side: 6  Number of biopsies from RACHEL #1 [PI-3 Lt. PZ]: 3  Number of biopsies from RACHEL #2 [PI-3 Lt. TZ]: 3  PSA-Density: 0.1 ng/mL/cc.     DESCRIPTION OF PROCEDURE:   Transrectal ultrasonography of the prostate and seminal vesicles was performed  throughout the entirety of both of these glands in the transverse ad sagittal planes using a [employer]  ultrasound System, and a 8 MHZ endorectal probe. Representative pictures were taken of the base, mid-aspect, and apical aspects for the prostate gland and these were preserved on hard copy. Pathology was noted. Prostate volume was determined by imaging the prostate in the transverse and sagittal planes and determining maximum height, width and length dimensions. The decision was then made to proceed with prostate biopsy under ultrasound and MRI-US fusion guidance. Using a 7\" 22 gauge spinal needle and using ultrasound guidance, I injected  1% xylocaine solution in each of the following 2 locations: Junction of the prostate and seminal vesicle at the right base; junction of the prostate and seminal vesicle at the left base.     After the infiltrations were performed, the Emotient system was utilized to perform MRI-ultrasound fusion to identify and delineate the region(s) of interest for targeted biopsy. Using a [employer] Max Core 18 G disposable biopsy instrument cores were obtained from the RACHEL(s) as delineated in the findings section with good targeting.  Then, standard sextant cores were obtained from the medial and lateral aspects of each lobe from the peripheral zone at the base, mid-aspect and apical aspects of the gland. The biopsies were performed with the aid of ultrasound imaging in the transverse and sagittal planes. Good cores were obtained and these were sent to Pathology in formalin for examination.     The patient tolerated the procedure well. He is cautioned that his urine, stool, and semen may all show evidence of blood for the time being. Furthermore, he will finish his antibiotics as instructed.    F/U in 2 weeks for pathology discussion.     Luis Ocampo MD  05/03/24

## 2024-08-20 DIAGNOSIS — E78.5 HYPERLIPIDEMIA, UNSPECIFIED HYPERLIPIDEMIA TYPE: ICD-10-CM

## 2024-08-22 RX ORDER — ROSUVASTATIN CALCIUM 5 MG/1
5 TABLET, COATED ORAL NIGHTLY
Qty: 90 TABLET | Refills: 0 | Status: SHIPPED | OUTPATIENT
Start: 2024-08-22

## 2024-08-22 NOTE — TELEPHONE ENCOUNTER
Please review; protocol failed/ has no protocol      No active /future labs noted     Please see message below for upcoming appointment.    Future Appointments   Date Time Provider Department Center   11/18/2024  4:40 PM Josue Bryant DO [facility]       Requested Prescriptions   Pending Prescriptions Disp Refills    rosuvastatin 5 MG Oral Tab 90 tablet 1     Sig: Take 1 tablet (5 mg total) by mouth nightly.       Cholesterol Medication Protocol Failed - 8/20/2024  4:44 PM        Failed - ALT < 80     Lab Results   Component Value Date    ALT 19 06/13/2023             Failed - ALT resulted within past year        Failed - Lipid panel within past 12 months     Lab Results   Component Value Date    CHOLEST 107 06/13/2023    TRIG 96 06/13/2023    HDL 40 06/13/2023    LDL 49 06/13/2023    VLDL 14 06/13/2023    NONHDLC 67 06/13/2023             Passed - In person appointment or virtual visit in the past 12 mos or appointment in next 3 mos     Recent Outpatient Visits              3 months ago Elevated PSA    [facility]    Office Visit    7 months ago Type 2 diabetes mellitus without complication, without long-term current use of insulin ([facility])    [facility]    Nurse Only    8 months ago Type 2 diabetes mellitus without complication, without long-term current use of insulin ([facility])    [facility] Jsoue Bryant DO    Office Visit    11 months ago Elevated PSA    [facility]    Office Visit    1 year ago Routine physical examination    [facility] Josue Bryant DO    Office Visit          Future Appointments         Provider Department Appt Notes    In 2 months Josue Bryant DO [facility] N/a, last px  6/8/2023                       Recent Outpatient Visits              3 months ago Elevated PSA    [facility]    [facility]    7 months ago Type 2 diabetes mellitus without complication, without long-term current use of insulin ([facility])    [facility]    Nurse Only    8 months ago Type 2 diabetes mellitus without complication, without long-term current use of insulin ([facility])    [facility] Josue Bryant DO    Office Visit    11 months ago Elevated PSA    [facility]    Office Visit    1 year ago Routine physical examination    [facility] Josue Bryant DO    Office Visit          Future Appointments         Provider Department Appt Notes    In 2 months Josue Bryant DO [facility] N/a, last px 6/8/2023

## 2024-10-10 DIAGNOSIS — I10 ESSENTIAL HYPERTENSION: ICD-10-CM

## 2024-10-11 RX ORDER — LISINOPRIL 10 MG/1
10 TABLET ORAL DAILY
Qty: 90 TABLET | Refills: 3 | Status: SHIPPED | OUTPATIENT
Start: 2024-10-11

## 2024-10-11 NOTE — TELEPHONE ENCOUNTER
Please Review. Protocol Failed; No Protocol   BP Readings from Last 1 Encounters:   12/11/23 148/80     Requested Prescriptions   Pending Prescriptions Disp Refills    LISINOPRIL 10 MG Oral Tab [Pharmacy Med Name: LISINOPRIL 10MG TABLETS] 90 tablet 3     Sig: TAKE 1 TABLET(10 MG) BY MOUTH DAILY       Hypertension Medications Protocol Failed - 10/11/2024  2:44 PM        Failed - CMP or BMP in past 12 months        Failed - Last BP reading less than 140/90     BP Readings from Last 1 Encounters:   12/11/23 148/80               Failed - EGFRCR or GFRAA > 50     GFR Evaluation            Passed - In person appointment or virtual visit in the past 12 mos or appointment in next 3 mos     Recent Outpatient Visits              5 months ago Elevated PSA    [facility] [place] [facility]    Office Visit    9 months ago Type 2 diabetes mellitus without complication, without long-term current use of insulin ([facility])    [facility]    Nurse Only    10 months ago Type 2 diabetes mellitus without complication, without long-term current use of insulin ([facility])    [facility] Josue Bryant DO    Office Visit    1 year ago Elevated PSA    [facility] [place] [place] [facility]    Office Visit    1 year ago Routine physical examination    [facility] Josue Bryant,     Office Visit          Future Appointments         Provider Department Appt Notes    In 1 month Josue Bryant,  [facility] N/a, last px 6/8/2023                           Future Appointments         Provider Department Appt Notes    In 1 month Josue Bryant DO [facility] N/a, last px 6/8/2023          Recent Outpatient Visits              5 months ago Elevated PSA     [facility] [place]    Office Visit    9 months ago Type 2 diabetes mellitus without complication, without long-term current use of insulin ([facility])    [facility] [place]    Nurse Only    10 months ago Type 2 diabetes mellitus without complication, without long-term current use of insulin ([facility])    [facility] Josue Bryant DO    Office Visit    1 year ago [facility]vated PSA    [facility]    [facility]    1 year ago Routine physical examination    [facility] Josue Bryant DO    Office Visit

## 2024-10-27 DIAGNOSIS — E11.9 TYPE 2 DIABETES MELLITUS WITHOUT COMPLICATION, WITHOUT LONG-TERM CURRENT USE OF INSULIN (HCC): ICD-10-CM

## 2024-10-29 RX ORDER — METFORMIN HYDROCHLORIDE 500 MG/1
500 TABLET, EXTENDED RELEASE ORAL 2 TIMES DAILY WITH MEALS
Qty: 180 TABLET | Refills: 0 | Status: SHIPPED | OUTPATIENT
Start: 2024-10-29

## 2024-10-29 NOTE — TELEPHONE ENCOUNTER
Please review; protocol failed/ has no protocol      Please see message below for upcoming appointment.    Future Appointments   Date Time Provider Department Center   11/18/2024  4:40 PM Josue Bryant DO [facility]       Requested Prescriptions   Pending Prescriptions Disp Refills    METFORMIN  MG Oral Tablet 24 Hr [Pharmacy Med Name: METFORMIN ER 500MG 24HR TABS] 180 tablet 3     Sig: TAKE 1 TABLET(500 MG) BY MOUTH TWICE DAILY WITH MEALS       Diabetes Medication Protocol Failed - 10/29/2024  2:48 PM        Failed - Last A1C < 7.5 and within past 6 months     Lab Results   Component Value Date    A1C 6.6 (A) 12/11/2023             Failed - EGFRCR or GFRAA > 50     GFR Evaluation            Failed - GFR in the past 12 months        Passed - In person appointment or virtual visit in the past 6 mos or appointment in next 3 mos     Recent Outpatient Visits              6 months ago Elevated PSA    [facility]    Office Visit    10 months ago Type 2 diabetes mellitus without complication, without long-term current use of insulin ([facility])    [facility]    Nurse Only    10 months ago Type 2 diabetes mellitus without complication, without long-term current use of insulin ([facility])    [facility] Josue Bryant DO    Office Visit    1 year ago Elevated PSA    [facility]    Office Visit    1 year ago Routine physical examination    [facility] Josue Bryant DO    Office Visit          Future Appointments         Provider Department Appt Notes    In 2 weeks Josue Bryant DO [facility] N/a, last px 6/8/2023                    Passed - Microalbumin procedure in past 12 months or taking ACE/ARB            Recent Outpatient Visits              6 months ago [facility]vated PSA    [facility]    [facility]    10 months ago Type 2 diabetes mellitus without complication, without long-term current use of insulin ([facility])    [facility]    Nurse Only    10 months ago Type 2 diabetes mellitus without complication, without long-term current use of insulin ([facility])    [facility] Josue Bryant DO    Office Visit    1 year ago Elevated PSA    [facility]    [facility] Visit    1 year ago Routine physical examination    [facility] Josue Bryant DO    Office Visit          Future Appointments         Provider Department Appt Notes    In 2 weeks Josue Bryant DO [facility] N/a, last px 6/8/2023

## 2024-11-19 DIAGNOSIS — E78.5 HYPERLIPIDEMIA, UNSPECIFIED HYPERLIPIDEMIA TYPE: ICD-10-CM

## 2024-11-25 NOTE — TELEPHONE ENCOUNTER
Please review; protocol failed/ has no protocol      No active /future labs noted   Please see message below for upcoming appointment.    Future Appointments   Date Time Provider Department Center   3/25/2025  3:40 PM Josue Bryant DO [facility]         Requested Prescriptions   Pending Prescriptions Disp Refills    ROSUVASTATIN 5 MG Oral Tab [Pharmacy Med Name: ROSUVASTATIN 5MG TABLETS] 90 tablet 0     Sig: TAKE 1 TABLET(5 MG) BY MOUTH EVERY NIGHT       Cholesterol Medication Protocol Failed - 11/25/2024 11:36 AM        Failed - ALT < 80     Lab Results   Component Value Date    ALT 19 06/13/2023             Failed - ALT resulted within past year        Failed - Lipid panel within past 12 months     Lab Results   Component Value Date    CHOLEST 107 06/13/2023    TRIG 96 06/13/2023    HDL 40 06/13/2023    LDL 49 06/13/2023    VLDL 14 06/13/2023    NONHDLC 67 06/13/2023             Passed - In person appointment or virtual visit in the past 12 mos or appointment in next 3 mos     Recent Outpatient Visits              7 months ago Elevated PSA    [facility]    [facility] Visit    11 months ago Type 2 diabetes mellitus without complication, without long-term current use of insulin ([facility])    [facility]    Nurse Only    11 months ago Type 2 diabetes mellitus without complication, without long-term current use of insulin ([facility])    [facility] Josue Bryant DO    Office Visit    1 year ago Elevated PSA    [facility] [place] [facility]    Office Visit    1 year ago Routine physical examination    [facility] Josue Bryant,     Office Visit          Future Appointments         Provider Department Appt Notes    In 4 months Josue Bryant DO [facility]  [facility] px, last px: 06/08/23, policy informed                       Recent Outpatient Visits              7 months ago Elevated PSA    [facility]    Office Visit    11 months ago Type 2 diabetes mellitus without complication, without long-term current use of insulin ([facility])    [facility]    Nurse Only    11 months ago Type 2 diabetes mellitus without complication, without long-term current use of insulin ([facility])    [facility] Josue Bryant,     Office Visit    1 year ago Elevated PSA    [facility]    Office Visit    1 year ago Routine physical examination    [facility] Josue Bryant DO    Office Visit          Future Appointments         Provider Department Appt Notes    In 4 months Josue Bryant DO [facility] px, last px: 06/08/23, policy informed

## 2024-11-26 RX ORDER — ROSUVASTATIN CALCIUM 5 MG/1
5 TABLET, COATED ORAL NIGHTLY
Qty: 90 TABLET | Refills: 0 | Status: SHIPPED | OUTPATIENT
Start: 2024-11-26

## 2024-12-10 DIAGNOSIS — E11.9 TYPE 2 DIABETES MELLITUS WITHOUT COMPLICATION, WITHOUT LONG-TERM CURRENT USE OF INSULIN (HCC): ICD-10-CM

## 2024-12-16 RX ORDER — GLIMEPIRIDE 1 MG/1
1 TABLET ORAL 2 TIMES DAILY
Qty: 180 TABLET | Refills: 3 | Status: SHIPPED | OUTPATIENT
Start: 2024-12-16

## 2024-12-16 NOTE — TELEPHONE ENCOUNTER
Please Review. Protocol Failed; No Protocol   Lab Results   Component Value Date     A1C 6.6 (A) 12/11/2023     Future Appointments   Date Time Provider Department Center   3/25/2025  3:40 PM Josue Bryant DO [facility]       Requested Prescriptions   Pending Prescriptions Disp Refills    GLIMEPIRIDE 1 MG Oral Tab [Pharmacy Med Name: GLIMEPIRIDE 1MG TABLETS] 180 tablet 3     Sig: TAKE 1 TABLET(1 MG) BY MOUTH TWICE DAILY       Diabetes Medication Protocol Failed - 12/16/2024  8:23 AM        Failed - Last A1C < 7.5 and within past 6 months     Lab Results   Component Value Date    A1C 6.6 (A) 12/11/2023             Failed - In person appointment or virtual visit in the past 6 mos or appointment in next 3 mos     Recent Outpatient Visits              7 months ago [facility]vated PSA    [facility]    [facility]    11 months ago Type 2 diabetes mellitus without complication, without long-term current use of insulin ([facility])    [facility]    Nurse Only    1 year ago Type 2 diabetes mellitus without complication, without long-term current use of insulin ([facility])    [facility] Josue Bryant DO    Office Visit    1 year ago Elevated PSA    [facility]    Office Visit    1 year ago Routine physical examination    [facility] Josue Bryant DO    Office Visit          Future Appointments         Provider Department Appt Notes    In 3 months Josue Bryant DO [facility] px, last px: 06/08/23, policy informed                    Failed - EGFRCR or GFRAA > 50     GFR Evaluation            Failed - GFR in the past 12 months        Passed - Microalbumin procedure in past 12 months or taking ACE/ARB               Future  Appointments         Provider Department Appt Notes    In 3 months Josue Bryant,  [facility] px, last px: 06/08/23, policy informed          Recent Outpatient Visits              7 months ago Elevated PSA    [facility]    Office Visit    11 months ago Type 2 diabetes mellitus without complication, without long-term current use of insulin ([facility])    [facility]    Nurse Only    1 year ago Type 2 diabetes mellitus without complication, without long-term current use of insulin ([facility])    [facility] Josue Bryant,     Office Visit    1 year ago Elevated PSA    [facility]    Office Visit    1 year ago Routine physical examination    [facility] Josue Bryant,     Office Visit

## 2025-02-04 DIAGNOSIS — E11.9 TYPE 2 DIABETES MELLITUS WITHOUT COMPLICATION, WITHOUT LONG-TERM CURRENT USE OF INSULIN (HCC): ICD-10-CM

## 2025-02-07 RX ORDER — METFORMIN HYDROCHLORIDE 500 MG/1
500 TABLET, EXTENDED RELEASE ORAL 2 TIMES DAILY WITH MEALS
Qty: 60 TABLET | Refills: 1 | Status: SHIPPED | OUTPATIENT
Start: 2025-02-07

## 2025-02-07 NOTE — TELEPHONE ENCOUNTER
Please review; protocol failed/ has no protocol    No active /future labs noted   Please see message below for upcoming appointment.    Future Appointments   Date Time Provider Department Center   3/25/2025  3:40 PM Josue Bryant DO [facility]       Requested Prescriptions   Pending Prescriptions Disp Refills    METFORMIN  MG Oral Tablet 24 Hr [Pharmacy Med Name: METFORMIN ER 500MG 24HR TABS] 180 tablet 0     Sig: TAKE 1 TABLET(500 MG) BY MOUTH TWICE DAILY WITH MEALS       Diabetes Medication Protocol Failed - 2/7/2025 11:22 AM        Failed - Last A1C < 7.5 and within past 6 months     Lab Results   Component Value Date    A1C 6.6 (A) 12/11/2023             Failed - EGFRCR or GFRAA > 50     GFR Evaluation            Failed - GFR in the past 12 months        Passed - In person appointment or virtual visit in the past 6 mos or appointment in next 3 mos     Recent Outpatient Visits              [facility] months [facility]    [facility]    1 year ago Type 2 diabetes mellitus without complication, without long-term current use of insulin ([facility])    [facility]    Nurse Only    1 year ago Type 2 diabetes mellitus without complication, without long-term current use of insulin ([facility])    [facility] Josue Bryant DO    Office Visit    1 year ago [facility]    [facility]    1 year ago Routine physical examination    [facility] Josue Bryant DO    Office Visit          Future Appointments         Provider Department Appt Notes    In 1 month Josue Bryant DO [facility] px, last px: 06/08/23, policy informed                    Passed - Microalbumin procedure in past 12  months or taking ACE/ARB        Passed - Medication is active on med list           Recent Outpatient Visits              9 months [facility]    [facility]    1 year ago Type 2 diabetes mellitus without complication, without long-term current use of insulin ([facility])    [facility]    Nurse Only    1 year ago Type 2 diabetes mellitus without complication, without long-term current use of insulin ([facility])    [facility] Josue Bryant DO    Office Visit    1 year ago [facility]vated PSA    [facility]    [facility]    1 year ago Routine physical examination    [facility] Josue Bryant DO    Office Visit          Future Appointments         Provider Department Appt Notes    In 1 month Josue Bryant DO [facility] px, last px: 06/08/23, policy informed

## 2025-02-25 DIAGNOSIS — E78.5 HYPERLIPIDEMIA, UNSPECIFIED HYPERLIPIDEMIA TYPE: ICD-10-CM

## 2025-02-28 RX ORDER — ROSUVASTATIN CALCIUM 5 MG/1
5 TABLET, COATED ORAL NIGHTLY
Qty: 90 TABLET | Refills: 0 | Status: SHIPPED | OUTPATIENT
Start: 2025-02-28

## 2025-02-28 NOTE — TELEPHONE ENCOUNTER
Please review; protocol failed/No Protocol      Future Appointments   Date Time Provider Department Center   3/25/2025  3:40 PM [facility]     No Active/Future labs pended

## 2025-03-25 ENCOUNTER — OFFICE VISIT (OUTPATIENT)
Dept: FAMILY MEDICINE CLINIC | Facility: CLINIC | Age: 75
End: 2025-03-25
Payer: COMMERCIAL

## 2025-03-25 ENCOUNTER — LAB ENCOUNTER (OUTPATIENT)
Dept: LAB | Age: 75
End: 2025-03-25
Attending: FAMILY MEDICINE
Payer: COMMERCIAL

## 2025-03-25 VITALS
DIASTOLIC BLOOD PRESSURE: 67 MMHG | WEIGHT: 177 LBS | HEART RATE: 61 BPM | TEMPERATURE: 98 F | OXYGEN SATURATION: 98 % | RESPIRATION RATE: 18 BRPM | BODY MASS INDEX: 25.34 KG/M2 | SYSTOLIC BLOOD PRESSURE: 120 MMHG | HEIGHT: 70 IN

## 2025-03-25 DIAGNOSIS — Z00.00 ROUTINE PHYSICAL EXAMINATION: ICD-10-CM

## 2025-03-25 DIAGNOSIS — Z28.21 INFLUENZA VACCINATION DECLINED: ICD-10-CM

## 2025-03-25 DIAGNOSIS — I10 ESSENTIAL HYPERTENSION: ICD-10-CM

## 2025-03-25 DIAGNOSIS — E11.9 TYPE 2 DIABETES MELLITUS WITHOUT COMPLICATION, WITHOUT LONG-TERM CURRENT USE OF INSULIN (HCC): ICD-10-CM

## 2025-03-25 DIAGNOSIS — Z28.21 VARICELLA ZOSTER VIRUS (VZV) VACCINATION DECLINED: ICD-10-CM

## 2025-03-25 DIAGNOSIS — E78.5 HYPERLIPIDEMIA, UNSPECIFIED HYPERLIPIDEMIA TYPE: ICD-10-CM

## 2025-03-25 DIAGNOSIS — Z00.00 ROUTINE PHYSICAL EXAMINATION: Primary | ICD-10-CM

## 2025-03-25 LAB
ALBUMIN SERPL-MCNC: 4.6 G/DL (ref 3.2–4.8)
ALBUMIN/GLOB SERPL: 1.5 {RATIO} (ref 1–2)
ALP LIVER SERPL-CCNC: 71 U/L
ALT SERPL-CCNC: 10 U/L
ANION GAP SERPL CALC-SCNC: 6 MMOL/L (ref 0–18)
AST SERPL-CCNC: 17 U/L (ref ?–34)
BASOPHILS # BLD AUTO: 0.04 X10(3) UL (ref 0–0.2)
BASOPHILS NFR BLD AUTO: 0.8 %
BILIRUB SERPL-MCNC: 0.6 MG/DL (ref 0.2–1.1)
BILIRUB UR QL: NEGATIVE
BUN BLD-MCNC: 18 MG/DL (ref 9–23)
BUN/CREAT SERPL: 14.2 (ref 10–20)
CALCIUM BLD-MCNC: 9.6 MG/DL (ref 8.7–10.4)
CHLORIDE SERPL-SCNC: 101 MMOL/L (ref 98–112)
CHOLEST SERPL-MCNC: 120 MG/DL (ref ?–200)
CLARITY UR: CLEAR
CO2 SERPL-SCNC: 28 MMOL/L (ref 21–32)
COLOR UR: COLORLESS
COMPLEXED PSA SERPL-MCNC: 4.66 NG/ML (ref ?–4)
CREAT BLD-MCNC: 1.27 MG/DL
CREAT UR-SCNC: 27.7 MG/DL
DEPRECATED RDW RBC AUTO: 40.1 FL (ref 35.1–46.3)
EGFRCR SERPLBLD CKD-EPI 2021: 60 ML/MIN/1.73M2 (ref 60–?)
EOSINOPHIL # BLD AUTO: 0.19 X10(3) UL (ref 0–0.7)
EOSINOPHIL NFR BLD AUTO: 3.7 %
ERYTHROCYTE [DISTWIDTH] IN BLOOD BY AUTOMATED COUNT: 13.8 % (ref 11–15)
FASTING PATIENT LIPID ANSWER: YES
FASTING STATUS PATIENT QL REPORTED: YES
GLOBULIN PLAS-MCNC: 3.1 G/DL (ref 2–3.5)
GLUCOSE BLD-MCNC: 88 MG/DL (ref 70–99)
GLUCOSE UR-MCNC: NORMAL MG/DL
HCT VFR BLD AUTO: 39.3 %
HDLC SERPL-MCNC: 38 MG/DL (ref 40–59)
HEMOGLOBIN A1C: 6.4 % (ref 4.3–5.6)
HGB BLD-MCNC: 13 G/DL
HGB UR QL STRIP.AUTO: NEGATIVE
IMM GRANULOCYTES # BLD AUTO: 0.03 X10(3) UL (ref 0–1)
IMM GRANULOCYTES NFR BLD: 0.6 %
KETONES UR-MCNC: NEGATIVE MG/DL
LDLC SERPL CALC-MCNC: 64 MG/DL (ref ?–100)
LEUKOCYTE ESTERASE UR QL STRIP.AUTO: NEGATIVE
LYMPHOCYTES # BLD AUTO: 1.35 X10(3) UL (ref 1–4)
LYMPHOCYTES NFR BLD AUTO: 26.6 %
MCH RBC QN AUTO: 26.6 PG (ref 26–34)
MCHC RBC AUTO-ENTMCNC: 33.1 G/DL (ref 31–37)
MCV RBC AUTO: 80.5 FL
MICROALBUMIN UR-MCNC: <0.3 MG/DL
MONOCYTES # BLD AUTO: 0.41 X10(3) UL (ref 0.1–1)
MONOCYTES NFR BLD AUTO: 8.1 %
NEUTROPHILS # BLD AUTO: 3.06 X10 (3) UL (ref 1.5–7.7)
NEUTROPHILS # BLD AUTO: 3.06 X10(3) UL (ref 1.5–7.7)
NEUTROPHILS NFR BLD AUTO: 60.2 %
NITRITE UR QL STRIP.AUTO: NEGATIVE
NONHDLC SERPL-MCNC: 82 MG/DL (ref ?–130)
OSMOLALITY SERPL CALC.SUM OF ELEC: 281 MOSM/KG (ref 275–295)
PH UR: 5 [PH] (ref 5–8)
PLATELET # BLD AUTO: 229 10(3)UL (ref 150–450)
POTASSIUM SERPL-SCNC: 4.4 MMOL/L (ref 3.5–5.1)
PROT SERPL-MCNC: 7.7 G/DL (ref 5.7–8.2)
PROT UR-MCNC: NEGATIVE MG/DL
RBC # BLD AUTO: 4.88 X10(6)UL
SODIUM SERPL-SCNC: 135 MMOL/L (ref 136–145)
SP GR UR STRIP: 1 (ref 1–1.03)
TRIGL SERPL-MCNC: 95 MG/DL (ref 30–149)
TSI SER-ACNC: 2.26 UIU/ML (ref 0.55–4.78)
UROBILINOGEN UR STRIP-ACNC: NORMAL
VLDLC SERPL CALC-MCNC: 14 MG/DL (ref 0–30)
WBC # BLD AUTO: 5.1 X10(3) UL (ref 4–11)

## 2025-03-25 PROCEDURE — 81003 URINALYSIS AUTO W/O SCOPE: CPT

## 2025-03-25 PROCEDURE — 36415 COLL VENOUS BLD VENIPUNCTURE: CPT

## 2025-03-25 PROCEDURE — 83036 HEMOGLOBIN GLYCOSYLATED A1C: CPT | Performed by: FAMILY MEDICINE

## 2025-03-25 PROCEDURE — 99397 PER PM REEVAL EST PAT 65+ YR: CPT | Performed by: FAMILY MEDICINE

## 2025-03-25 PROCEDURE — 82043 UR ALBUMIN QUANTITATIVE: CPT

## 2025-03-25 PROCEDURE — 82570 ASSAY OF URINE CREATININE: CPT

## 2025-03-25 PROCEDURE — 85025 COMPLETE CBC W/AUTO DIFF WBC: CPT

## 2025-03-25 PROCEDURE — 84443 ASSAY THYROID STIM HORMONE: CPT

## 2025-03-25 PROCEDURE — 80061 LIPID PANEL: CPT

## 2025-03-25 PROCEDURE — 80053 COMPREHEN METABOLIC PANEL: CPT

## 2025-03-25 NOTE — PROGRESS NOTES
Subjective:     Patient ID: Jean Paul Donis is a 74 year old male.    74 year old AA diabetic/hypertensive/hyperlipidemic AA male here for complete preventive care physical and for status update on any confirmed chronic medical illnesses and follow up on any previous labs or procedures that were suggestive or in need of further work up. Colonoscopy is current. Bowel and bladder functions are intact.    Patient currently denies headaches, chest pain, dizziness, shortness of breath, acute visual changes, and/or exertional fatigue.    No urinary frequency and no excessive hunger or thirst.    Patient declines on influenza and pneumococcal and shingles vaccine.          History/Other:   Review of Systems  Current Outpatient Medications   Medication Sig Dispense Refill    rosuvastatin 5 MG Oral Tab Take 1 tablet (5 mg total) by mouth nightly. 90 tablet 0    metFORMIN  MG Oral Tablet 24 Hr Take 1 tablet (500 mg total) by mouth 2 (two) times daily with meals. 60 tablet 1    glimepiride 1 MG Oral Tab Take 1 tablet (1 mg total) by mouth 2 (two) times daily. 180 tablet 3    lisinopril 10 MG Oral Tab Take 1 tablet (10 mg total) by mouth daily. 90 tablet 3     Allergies:Allergies[1]    Past Medical History:    Diabetes (HCC)    High blood pressure    High cholesterol      Past Surgical History:   Procedure Laterality Date    Colonoscopy N/A 10/6/2022    Procedure: COLONOSCOPY;  Surgeon: Elizabeth Murguia MD;  Location: [facility] biopsy prostate uronav ([id number]=[id number])  5/3/2024      No family history on file.   Social History:   Social History     Socioeconomic History    Marital status:    Tobacco Use    Smoking status: Former    Smokeless tobacco: Never    Tobacco comments:     quit 10 yrs ago   Vaping Use    Vaping status: Never Used   Substance and Sexual Activity    Alcohol use: Not Currently     Comment: social    Drug use: Not Currently        Objective:   Vitals:    03/25/25 1539   BP: 120/67    Pulse: 61   Resp: 18   Temp: 97.8 °F (36.6 °C)       Physical Exam  Constitutional:       General: He is not in acute distress.     Appearance: Normal appearance. He is not ill-appearing.   HENT:      Right Ear: Tympanic membrane normal.      Left Ear: Tympanic membrane normal.      Nose: Nose normal.      Mouth/Throat:      Mouth: Mucous membranes are moist.   Neck:      Thyroid: No thyromegaly.   Cardiovascular:      Rate and Rhythm: Normal rate and regular rhythm.      Heart sounds:      No gallop.   Pulmonary:      Breath sounds: Normal breath sounds.   Abdominal:      General: Bowel sounds are normal.      Palpations: Abdomen is soft.   Feet:      Comments: Bilateral barefoot skin diabetic exam is normal, visualized feet and the appearance is normal.  Bilateral monofilament/sensation of both feet is normal.  Pulsation pedal pulse exam of both lower legs/feet is normal as well.        Neurological:      Mental Status: He is alert and oriented to person, place, and time.   Psychiatric:         Mood and Affect: Mood normal.         Assessment & Plan:   1. Routine physical examination  Well exam and the following labs have been ordered.  - Urinalysis, Routine [E]; Future  - CBC W Differential W Platelet [E]; Future  - Comp Metabolic Panel (14) [E]; Future  - Lipid Panel [E]; Future  - TSH W Reflex To Free T4 [E]; Future  - PSA (Screening) [E]; Future    2. Type 2 diabetes mellitus without complication, without long-term current use of insulin ([facility])  Diabetic status update on today including foot exam.  - POC Glycohemoglobin [75557].  A1c is 6.4.  Excellent control.  - Microalb/Creat Ratio, Random Urine [E]; Future  - Ophthalmology Referral - In Network    3. Essential hypertension  Blood pressure measures to goal.  Need to comply with medication and also minimize salt intake.    4. Hyperlipidemia, unspecified hyperlipidemia type  Status update on today.    5. Influenza vaccination declined  Declined by  patient.  - High Dose Fluzone trivalent influenza, 65yrs+ PFS (08280)    6. Varicella zoster virus (VZV) vaccination declined  Declined by patient.  - Zoster Recombinant Adjuvanted (Shingrix -Shingles) [13155]        Orders Placed This Encounter   Procedures    POC Glycohemoglobin [68372]    Microalb/Creat Ratio, Random Urine [E]    Urinalysis, Routine [E]    CBC W Differential W Platelet [E]    Comp Metabolic Panel (14) [E]    Lipid Panel [E]    TSH W Reflex To Free T4 [E]    PSA (Screening) [E]    High Dose Fluzone trivalent influenza, 65yrs+ PFS (90662)    Zoster Recombinant Adjuvanted (Shingrix -Shingles) [53214]       Meds This Visit:  Requested Prescriptions      No prescriptions requested or ordered in this encounter       Imaging & Referrals:  INFLUENZA VAC HIGH DOSE PRSV FREE  ZOSTER VACC RECOMBINANT IM NJX  OPHTHALMOLOGY - INTERNAL     Patient Instructions   All adult screening ordered and done appropriate for patient's age and gender and risk factors and complaints.  Encouraged physical fitness and daily physical activity daily.  Monitor blood pressures and record at home. Limit salt intake.  Medication reviewed and renewed where needed and appropriate.  Comply with medications.    Return in about 1 year (around 3/25/2026), or if symptoms worsen or fail to improve.         [1] No Known Allergies

## 2025-04-24 DIAGNOSIS — E11.9 TYPE 2 DIABETES MELLITUS WITHOUT COMPLICATION, WITHOUT LONG-TERM CURRENT USE OF INSULIN (HCC): ICD-10-CM

## 2025-04-28 RX ORDER — METFORMIN HYDROCHLORIDE 500 MG/1
500 TABLET, EXTENDED RELEASE ORAL 2 TIMES DAILY WITH MEALS
Qty: 180 TABLET | Refills: 3 | Status: SHIPPED | OUTPATIENT
Start: 2025-04-28

## 2025-04-28 NOTE — TELEPHONE ENCOUNTER
Refill passed per [facility] protocols.    Requested Prescriptions   Pending Prescriptions Disp Refills    METFORMIN  MG Oral Tablet 24 Hr [Pharmacy Med Name: METFORMIN ER 500MG 24HR TABS] 180 tablet 3     Sig: TAKE 1 TABLET(500 MG) BY MOUTH TWICE DAILY WITH MEALS       Diabetes Medication Protocol Passed - 4/28/2025  3:07 PM

## 2025-05-29 DIAGNOSIS — E78.5 HYPERLIPIDEMIA, UNSPECIFIED HYPERLIPIDEMIA TYPE: ICD-10-CM

## 2025-05-30 RX ORDER — ROSUVASTATIN CALCIUM 5 MG/1
5 TABLET, COATED ORAL NIGHTLY
Qty: 90 TABLET | Refills: 3 | Status: SHIPPED | OUTPATIENT
Start: 2025-05-30

## 2025-06-05 ENCOUNTER — PATIENT MESSAGE (OUTPATIENT)
Dept: SURGERY | Facility: CLINIC | Age: 75
End: 2025-06-05

## 2025-07-07 ENCOUNTER — TELEPHONE (OUTPATIENT)
Facility: CLINIC | Age: 75
End: 2025-07-07

## 2025-07-07 DIAGNOSIS — Z12.11 SCREEN FOR COLON CANCER: Primary | ICD-10-CM

## (undated) DIAGNOSIS — I10 ESSENTIAL HYPERTENSION: ICD-10-CM

## (undated) DIAGNOSIS — E11.9 TYPE 2 DIABETES MELLITUS WITHOUT COMPLICATION, WITHOUT LONG-TERM CURRENT USE OF INSULIN (HCC): ICD-10-CM

## (undated) DIAGNOSIS — E78.5 HYPERLIPIDEMIA, UNSPECIFIED HYPERLIPIDEMIA TYPE: ICD-10-CM

## (undated) DEVICE — STERILE LATEX POWDER-FREE SURGICAL GLOVESWITH NITRILE COATING: Brand: PROTEXIS

## (undated) DEVICE — KIT ENDO ORCAPOD 160/180/190

## (undated) DEVICE — KIT CLEAN ENDOKIT 1.1OZ GOWNX2

## (undated) DEVICE — MEDI-VAC NON-CONDUCTIVE SUCTION TUBING 6MM X 1.8M (6FT.) L: Brand: CARDINAL HEALTH

## (undated) DEVICE — LINE MNTR ADLT SET O2 INTMD

## (undated) DEVICE — 35 ML SYRINGE REGULAR TIP: Brand: MONOJECT

## (undated) NOTE — LETTER
[facility] ULTRASOUND  [address]  [place] [address]  Authorization for Imaging Procedure  Date of Procedure:     I hereby authorize  ____________, my physician and his/her assistants (if applicable), which may include medical students, residents, and/or fellows, to perform the following procedure and administer such anesthesia as may be determined necessary by my physician: ULTRASOUND URONAV GUIDED PROSTATE BIOPSY (CPT=[id number]) on Jean Paul Donis.   2.  I recognize that during the procedure, unforeseen conditions may necessitate additional or different procedures than those listed above. I, therefore, further authorize and request that the above-named physician, assistants, or designees perform such procedures as are, in their judgment, necessary and desirable.    3.  My physician has discussed prior to my procedure the potential benefits, risks and side effects of this procedure; the likelihood of achieving goals; and potential problems that might occur during recuperation. They also discussed reasonable alternatives to the procedure, including risks, benefits, and side effects related to the alternatives and risks related to not receiving this procedure. I have had all my questions answered and I acknowledge that no guarantee has been made as to the result that may be obtained.    4.  Should the need arise during my procedure, which includes change of level of care prior to discharge, I also consent to the administration of blood and/or blood products. Further, I understand that despite careful testing and screening of blood or blood products by collecting agencies, I may still be subject to ill effects as a result of receiving a blood transfusion and/or blood products. The following are some, but not all, of the potential risks that can occur: fever and allergic reactions, hemolytic reactions, transmission of diseases such as Hepatitis, AIDS and Cytomegalovirus (CMV) and fluid  overload. In the event that I wish to have an autologous transfusion of my own blood, or a directed donor transfusion, I will discuss this with my physician.  Check only if Refusing Blood or Blood Products  I understand refusal of blood or blood products as deemed necessary by my physician may have serious consequences to my condition to include possible death. I hereby assume responsibility for my refusal and release the hospital, its personnel, and my physicians from any responsibility for the consequences of my refusal.   [  ] Patient Refuses Blood      5.  I authorize the use of any specimen, organs, tissues, body parts or foreign objects that may be removed from my body during the procedure for diagnosis, research or teaching purposes and their subsequent disposal by hospital authorities. I also authorize the release of specimen test results and/or written reports to my treating physician on the hospital medical staff or other referring or consulting physicians involved in my care, at the discretion of the Pathologist or my treating physician.    6.  I consent to the photographing or videotaping of the procedures to be performed, including appropriate portions of my body for medical, scientific, or educational purposes, provided my identity is not revealed by the pictures or by descriptive texts accompanying them. If the procedure has been photographed/videotaped, the physician will obtain the original picture, image, videotape or CD. The hospital will not be responsible for storage, release or maintenance of the picture, image, tape or CD.   7.  I consent to the presence of a  or observers in the operating room as deemed necessary by my physician or their designees.    8.  I recognize that in the event my procedure results in extended X-Ray/fluoroscopy time, I may develop a skin reaction.    9.  If I have a Do Not Attempt Resuscitation (DNAR) order in place, that status will be suspended  while in the operating room, procedural suite, and during the recovery period unless otherwise explicitly stated by me (or a person authorized to consent on my behalf). The performing physician or my attending physician will determine when the applicable recovery period ends for purposes of reinstating the DNAR order.  10.  I acknowledge that my physician has explained sedation/analgesia administration to me including the risk and benefits I consent to the administration of sedation/analgesia as may be necessary or desirable in the judgment of my physician.      I CERTIFY THAT I HAVE READ AND FULLY UNDERSTAND THE ABOVE CONSENT FOR THE PROCEDURE.     Signature of Patient: _____________________________________________________________  Responsible person in case of minor, unconscious: ____________________________________  Relationship to patient:  __________________________________________________________    Signature of Witness: _______________________________Date: _________Time: __________  Statement of Physician: My signature below affirms that prior to the time of the procedure, I have explained to the patient and/or his guardian, the risks and benefits involved in the proposed treatment and any reasonable alternative to the proposed treatment. I have also explained the risks and benefits involved in the refusal of the proposed treatment and have answered the patient's questions. If I have a significant financial interest in a co-management agreement or a significant financial interest in any product or implant, or other significant relationship used in the procedure/surgery, I have disclosed this and had a discussion with my patient.  Signature of Physician:   _________________________________Date:_____________Time:________  Patient Name: Jean Paul Donis : 1950  Printed: 2024   Medical Record #: [id number]